# Patient Record
Sex: FEMALE | Race: WHITE | Employment: OTHER | URBAN - METROPOLITAN AREA
[De-identification: names, ages, dates, MRNs, and addresses within clinical notes are randomized per-mention and may not be internally consistent; named-entity substitution may affect disease eponyms.]

---

## 2018-12-13 ENCOUNTER — HOSPITAL ENCOUNTER (OUTPATIENT)
Age: 48
Setting detail: OBSERVATION
Discharge: HOME | End: 2018-12-14
Attending: EMERGENCY MEDICINE | Admitting: SURGERY
Payer: COMMERCIAL

## 2018-12-13 ENCOUNTER — APPOINTMENT (OUTPATIENT)
Dept: ULTRASOUND IMAGING | Age: 48
End: 2018-12-13
Attending: EMERGENCY MEDICINE
Payer: COMMERCIAL

## 2018-12-13 ENCOUNTER — ANESTHESIA (OUTPATIENT)
Dept: SURGERY | Age: 48
End: 2018-12-13
Payer: COMMERCIAL

## 2018-12-13 ENCOUNTER — APPOINTMENT (OUTPATIENT)
Dept: CT IMAGING | Age: 48
End: 2018-12-13
Attending: EMERGENCY MEDICINE
Payer: COMMERCIAL

## 2018-12-13 ENCOUNTER — ANESTHESIA EVENT (OUTPATIENT)
Dept: SURGERY | Age: 48
End: 2018-12-13
Payer: COMMERCIAL

## 2018-12-13 DIAGNOSIS — K81.9 CHOLECYSTITIS: Primary | ICD-10-CM

## 2018-12-13 LAB
ALBUMIN SERPL-MCNC: 3.8 G/DL (ref 3.5–5)
ALBUMIN/GLOB SERPL: 1 {RATIO} (ref 1.1–2.2)
ALP SERPL-CCNC: 101 U/L (ref 45–117)
ALT SERPL-CCNC: 30 U/L (ref 12–78)
ANION GAP SERPL CALC-SCNC: 10 MMOL/L (ref 5–15)
APPEARANCE UR: CLEAR
AST SERPL-CCNC: 20 U/L (ref 15–37)
ATRIAL RATE: 73 BPM
BACTERIA URNS QL MICRO: NEGATIVE /HPF
BASOPHILS # BLD: 0 K/UL (ref 0–0.1)
BASOPHILS NFR BLD: 1 % (ref 0–1)
BILIRUB SERPL-MCNC: 0.4 MG/DL (ref 0.2–1)
BILIRUB UR QL: NEGATIVE
BUN SERPL-MCNC: 17 MG/DL (ref 6–20)
BUN/CREAT SERPL: 24 (ref 12–20)
CALCIUM SERPL-MCNC: 9.3 MG/DL (ref 8.5–10.1)
CALCULATED P AXIS, ECG09: 47 DEGREES
CALCULATED R AXIS, ECG10: 101 DEGREES
CALCULATED T AXIS, ECG11: 37 DEGREES
CHLORIDE SERPL-SCNC: 101 MMOL/L (ref 97–108)
CO2 SERPL-SCNC: 29 MMOL/L (ref 21–32)
COLOR UR: NORMAL
COMMENT, HOLDF: NORMAL
CREAT SERPL-MCNC: 0.7 MG/DL (ref 0.55–1.02)
D DIMER PPP FEU-MCNC: 0.69 MG/L FEU (ref 0–0.65)
DIAGNOSIS, 93000: NORMAL
DIFFERENTIAL METHOD BLD: NORMAL
EOSINOPHIL # BLD: 0.2 K/UL (ref 0–0.4)
EOSINOPHIL NFR BLD: 3 % (ref 0–7)
EPITH CASTS URNS QL MICRO: NORMAL /LPF
ERYTHROCYTE [DISTWIDTH] IN BLOOD BY AUTOMATED COUNT: 11.5 % (ref 11.5–14.5)
GLOBULIN SER CALC-MCNC: 3.8 G/DL (ref 2–4)
GLUCOSE SERPL-MCNC: 119 MG/DL (ref 65–100)
GLUCOSE UR STRIP.AUTO-MCNC: NEGATIVE MG/DL
HCG UR QL: NEGATIVE
HCT VFR BLD AUTO: 43.9 % (ref 35–47)
HGB BLD-MCNC: 14.4 G/DL (ref 11.5–16)
HGB UR QL STRIP: NEGATIVE
IMM GRANULOCYTES # BLD: 0 K/UL (ref 0–0.04)
IMM GRANULOCYTES NFR BLD AUTO: 0 % (ref 0–0.5)
INR PPP: 0.9 (ref 0.9–1.1)
KETONES UR QL STRIP.AUTO: NEGATIVE MG/DL
LEUKOCYTE ESTERASE UR QL STRIP.AUTO: NEGATIVE
LIPASE SERPL-CCNC: 149 U/L (ref 73–393)
LYMPHOCYTES # BLD: 2.8 K/UL (ref 0.8–3.5)
LYMPHOCYTES NFR BLD: 33 % (ref 12–49)
MCH RBC QN AUTO: 30.1 PG (ref 26–34)
MCHC RBC AUTO-ENTMCNC: 32.8 G/DL (ref 30–36.5)
MCV RBC AUTO: 91.8 FL (ref 80–99)
MONOCYTES # BLD: 0.5 K/UL (ref 0–1)
MONOCYTES NFR BLD: 6 % (ref 5–13)
NEUTS SEG # BLD: 5 K/UL (ref 1.8–8)
NEUTS SEG NFR BLD: 58 % (ref 32–75)
NITRITE UR QL STRIP.AUTO: NEGATIVE
NRBC # BLD: 0 K/UL (ref 0–0.01)
NRBC BLD-RTO: 0 PER 100 WBC
P-R INTERVAL, ECG05: 188 MS
PH UR STRIP: 7.5 [PH] (ref 5–8)
PLATELET # BLD AUTO: 258 K/UL (ref 150–400)
PMV BLD AUTO: 10.4 FL (ref 8.9–12.9)
POTASSIUM SERPL-SCNC: 3.4 MMOL/L (ref 3.5–5.1)
PROT SERPL-MCNC: 7.6 G/DL (ref 6.4–8.2)
PROT UR STRIP-MCNC: NEGATIVE MG/DL
PROTHROMBIN TIME: 9.2 SEC (ref 9–11.1)
Q-T INTERVAL, ECG07: 386 MS
QRS DURATION, ECG06: 100 MS
QTC CALCULATION (BEZET), ECG08: 425 MS
RBC # BLD AUTO: 4.78 M/UL (ref 3.8–5.2)
RBC #/AREA URNS HPF: NORMAL /HPF (ref 0–5)
SAMPLES BEING HELD,HOLD: NORMAL
SODIUM SERPL-SCNC: 140 MMOL/L (ref 136–145)
SP GR UR REFRACTOMETRY: 1.01 (ref 1–1.03)
TROPONIN I SERPL-MCNC: <0.05 NG/ML
UR CULT HOLD, URHOLD: NORMAL
UROBILINOGEN UR QL STRIP.AUTO: 0.2 EU/DL (ref 0.2–1)
VENTRICULAR RATE, ECG03: 73 BPM
WBC # BLD AUTO: 8.5 K/UL (ref 3.6–11)
WBC URNS QL MICRO: NORMAL /HPF (ref 0–4)

## 2018-12-13 PROCEDURE — 77030008756 HC TU IRR SUC STRY -B: Performed by: SURGERY

## 2018-12-13 PROCEDURE — 77030020747 HC TU INSUF ENDOSC TELE -A: Performed by: SURGERY

## 2018-12-13 PROCEDURE — 74011000258 HC RX REV CODE- 258: Performed by: EMERGENCY MEDICINE

## 2018-12-13 PROCEDURE — 36415 COLL VENOUS BLD VENIPUNCTURE: CPT

## 2018-12-13 PROCEDURE — 77030035048 HC TRCR ENDOSC OPTCL COVD -B: Performed by: SURGERY

## 2018-12-13 PROCEDURE — 77030032490 HC SLV COMPR SCD KNE COVD -B: Performed by: SURGERY

## 2018-12-13 PROCEDURE — 74011636320 HC RX REV CODE- 636/320: Performed by: EMERGENCY MEDICINE

## 2018-12-13 PROCEDURE — 84484 ASSAY OF TROPONIN QUANT: CPT

## 2018-12-13 PROCEDURE — 74011250636 HC RX REV CODE- 250/636: Performed by: SURGERY

## 2018-12-13 PROCEDURE — 96361 HYDRATE IV INFUSION ADD-ON: CPT

## 2018-12-13 PROCEDURE — 74011250636 HC RX REV CODE- 250/636: Performed by: NURSE PRACTITIONER

## 2018-12-13 PROCEDURE — 77030009403 HC ELECTRD ENDO MEGA -B: Performed by: SURGERY

## 2018-12-13 PROCEDURE — 74174 CTA ABD&PLVS W/CONTRAST: CPT

## 2018-12-13 PROCEDURE — 77030020263 HC SOL INJ SOD CL0.9% LFCR 1000ML: Performed by: SURGERY

## 2018-12-13 PROCEDURE — 77030035045 HC TRCR ENDOSC VRSPRT BLDLSS COVD -B: Performed by: SURGERY

## 2018-12-13 PROCEDURE — 76705 ECHO EXAM OF ABDOMEN: CPT

## 2018-12-13 PROCEDURE — 74011250636 HC RX REV CODE- 250/636

## 2018-12-13 PROCEDURE — 99218 HC RM OBSERVATION: CPT

## 2018-12-13 PROCEDURE — 77030018836 HC SOL IRR NACL ICUM -A: Performed by: SURGERY

## 2018-12-13 PROCEDURE — 96376 TX/PRO/DX INJ SAME DRUG ADON: CPT

## 2018-12-13 PROCEDURE — 74011250637 HC RX REV CODE- 250/637: Performed by: EMERGENCY MEDICINE

## 2018-12-13 PROCEDURE — 81025 URINE PREGNANCY TEST: CPT

## 2018-12-13 PROCEDURE — 74011000250 HC RX REV CODE- 250

## 2018-12-13 PROCEDURE — 96375 TX/PRO/DX INJ NEW DRUG ADDON: CPT

## 2018-12-13 PROCEDURE — 93005 ELECTROCARDIOGRAM TRACING: CPT

## 2018-12-13 PROCEDURE — 76210000006 HC OR PH I REC 0.5 TO 1 HR: Performed by: SURGERY

## 2018-12-13 PROCEDURE — 96365 THER/PROPH/DIAG IV INF INIT: CPT

## 2018-12-13 PROCEDURE — 74011250636 HC RX REV CODE- 250/636: Performed by: ANESTHESIOLOGY

## 2018-12-13 PROCEDURE — 85379 FIBRIN DEGRADATION QUANT: CPT

## 2018-12-13 PROCEDURE — 74011250636 HC RX REV CODE- 250/636: Performed by: EMERGENCY MEDICINE

## 2018-12-13 PROCEDURE — 77030031139 HC SUT VCRL2 J&J -A: Performed by: SURGERY

## 2018-12-13 PROCEDURE — 77030020782 HC GWN BAIR PAWS FLX 3M -B

## 2018-12-13 PROCEDURE — 77030011640 HC PAD GRND REM COVD -A: Performed by: SURGERY

## 2018-12-13 PROCEDURE — 85610 PROTHROMBIN TIME: CPT

## 2018-12-13 PROCEDURE — 74011000250 HC RX REV CODE- 250: Performed by: SURGERY

## 2018-12-13 PROCEDURE — 76010000149 HC OR TIME 1 TO 1.5 HR: Performed by: SURGERY

## 2018-12-13 PROCEDURE — 77030008684 HC TU ET CUF COVD -B: Performed by: NURSE ANESTHETIST, CERTIFIED REGISTERED

## 2018-12-13 PROCEDURE — 76060000033 HC ANESTHESIA 1 TO 1.5 HR: Performed by: SURGERY

## 2018-12-13 PROCEDURE — 74011250637 HC RX REV CODE- 250/637: Performed by: SURGERY

## 2018-12-13 PROCEDURE — 99284 EMERGENCY DEPT VISIT MOD MDM: CPT

## 2018-12-13 PROCEDURE — 80053 COMPREHEN METABOLIC PANEL: CPT

## 2018-12-13 PROCEDURE — 81001 URINALYSIS AUTO W/SCOPE: CPT

## 2018-12-13 PROCEDURE — 77030002933 HC SUT MCRYL J&J -A: Performed by: SURGERY

## 2018-12-13 PROCEDURE — 77030026438 HC STYL ET INTUB CARD -A: Performed by: NURSE ANESTHETIST, CERTIFIED REGISTERED

## 2018-12-13 PROCEDURE — 77030010515 HC APPL ENDOCLP LIG J&J -B: Performed by: SURGERY

## 2018-12-13 PROCEDURE — 88304 TISSUE EXAM BY PATHOLOGIST: CPT

## 2018-12-13 PROCEDURE — 71275 CT ANGIOGRAPHY CHEST: CPT

## 2018-12-13 PROCEDURE — 77030018875 HC APPL CLP LIG4 J&J -B: Performed by: SURGERY

## 2018-12-13 PROCEDURE — 77030035029 HC NDL INSUF VERES DISP COVD -B: Performed by: SURGERY

## 2018-12-13 PROCEDURE — 85025 COMPLETE CBC W/AUTO DIFF WBC: CPT

## 2018-12-13 PROCEDURE — 77030009852 HC PCH RTVR ENDOSC COVD -B: Performed by: SURGERY

## 2018-12-13 PROCEDURE — 74011000258 HC RX REV CODE- 258: Performed by: NURSE PRACTITIONER

## 2018-12-13 PROCEDURE — 77030035051: Performed by: SURGERY

## 2018-12-13 PROCEDURE — 77030013079 HC BLNKT BAIR HGGR 3M -A: Performed by: NURSE ANESTHETIST, CERTIFIED REGISTERED

## 2018-12-13 PROCEDURE — 83690 ASSAY OF LIPASE: CPT

## 2018-12-13 RX ORDER — HYDROCODONE BITARTRATE AND ACETAMINOPHEN 5; 325 MG/1; MG/1
1 TABLET ORAL AS NEEDED
Status: DISCONTINUED | OUTPATIENT
Start: 2018-12-13 | End: 2018-12-14 | Stop reason: HOSPADM

## 2018-12-13 RX ORDER — ONDANSETRON 2 MG/ML
4 INJECTION INTRAMUSCULAR; INTRAVENOUS
Status: DISCONTINUED | OUTPATIENT
Start: 2018-12-13 | End: 2018-12-14 | Stop reason: HOSPADM

## 2018-12-13 RX ORDER — SODIUM CHLORIDE, SODIUM LACTATE, POTASSIUM CHLORIDE, CALCIUM CHLORIDE 600; 310; 30; 20 MG/100ML; MG/100ML; MG/100ML; MG/100ML
1000 INJECTION, SOLUTION INTRAVENOUS CONTINUOUS
Status: DISCONTINUED | OUTPATIENT
Start: 2018-12-13 | End: 2018-12-13 | Stop reason: HOSPADM

## 2018-12-13 RX ORDER — DEXAMETHASONE SODIUM PHOSPHATE 4 MG/ML
INJECTION, SOLUTION INTRA-ARTICULAR; INTRALESIONAL; INTRAMUSCULAR; INTRAVENOUS; SOFT TISSUE AS NEEDED
Status: DISCONTINUED | OUTPATIENT
Start: 2018-12-13 | End: 2018-12-13 | Stop reason: HOSPADM

## 2018-12-13 RX ORDER — BUPIVACAINE HYDROCHLORIDE AND EPINEPHRINE 5; 5 MG/ML; UG/ML
30 INJECTION, SOLUTION EPIDURAL; INTRACAUDAL; PERINEURAL ONCE
Status: COMPLETED | OUTPATIENT
Start: 2018-12-13 | End: 2018-12-13

## 2018-12-13 RX ORDER — OXYCODONE AND ACETAMINOPHEN 5; 325 MG/1; MG/1
1-2 TABLET ORAL
Qty: 20 TAB | Refills: 0 | Status: SHIPPED | OUTPATIENT
Start: 2018-12-13 | End: 2019-10-18

## 2018-12-13 RX ORDER — EPHEDRINE SULFATE 50 MG/ML
5 INJECTION, SOLUTION INTRAVENOUS AS NEEDED
Status: DISCONTINUED | OUTPATIENT
Start: 2018-12-13 | End: 2018-12-14 | Stop reason: HOSPADM

## 2018-12-13 RX ORDER — LEVETIRACETAM 500 MG/1
1000 TABLET ORAL 2 TIMES DAILY
Status: DISCONTINUED | OUTPATIENT
Start: 2018-12-13 | End: 2018-12-14 | Stop reason: HOSPADM

## 2018-12-13 RX ORDER — MORPHINE SULFATE 10 MG/ML
2 INJECTION, SOLUTION INTRAMUSCULAR; INTRAVENOUS
Status: DISCONTINUED | OUTPATIENT
Start: 2018-12-13 | End: 2018-12-14 | Stop reason: HOSPADM

## 2018-12-13 RX ORDER — FENTANYL CITRATE 50 UG/ML
50 INJECTION, SOLUTION INTRAMUSCULAR; INTRAVENOUS AS NEEDED
Status: DISCONTINUED | OUTPATIENT
Start: 2018-12-13 | End: 2018-12-13 | Stop reason: HOSPADM

## 2018-12-13 RX ORDER — LIDOCAINE HYDROCHLORIDE 10 MG/ML
0.1 INJECTION, SOLUTION EPIDURAL; INFILTRATION; INTRACAUDAL; PERINEURAL AS NEEDED
Status: DISCONTINUED | OUTPATIENT
Start: 2018-12-13 | End: 2018-12-13 | Stop reason: HOSPADM

## 2018-12-13 RX ORDER — SODIUM CHLORIDE 9 MG/ML
25 INJECTION, SOLUTION INTRAVENOUS CONTINUOUS
Status: DISCONTINUED | OUTPATIENT
Start: 2018-12-13 | End: 2018-12-13 | Stop reason: HOSPADM

## 2018-12-13 RX ORDER — GLYCOPYRROLATE 0.2 MG/ML
0.2 INJECTION INTRAMUSCULAR; INTRAVENOUS
Status: DISCONTINUED | OUTPATIENT
Start: 2018-12-13 | End: 2018-12-13 | Stop reason: HOSPADM

## 2018-12-13 RX ORDER — SODIUM CHLORIDE 9 MG/ML
25 INJECTION, SOLUTION INTRAVENOUS CONTINUOUS
Status: DISCONTINUED | OUTPATIENT
Start: 2018-12-14 | End: 2018-12-14 | Stop reason: HOSPADM

## 2018-12-13 RX ORDER — ONDANSETRON 2 MG/ML
INJECTION INTRAMUSCULAR; INTRAVENOUS AS NEEDED
Status: DISCONTINUED | OUTPATIENT
Start: 2018-12-13 | End: 2018-12-13 | Stop reason: HOSPADM

## 2018-12-13 RX ORDER — AMLODIPINE BESYLATE 5 MG/1
2.5 TABLET ORAL
Status: COMPLETED | OUTPATIENT
Start: 2018-12-13 | End: 2018-12-13

## 2018-12-13 RX ORDER — ROPIVACAINE HYDROCHLORIDE 5 MG/ML
30 INJECTION, SOLUTION EPIDURAL; INFILTRATION; PERINEURAL AS NEEDED
Status: DISCONTINUED | OUTPATIENT
Start: 2018-12-13 | End: 2018-12-13 | Stop reason: HOSPADM

## 2018-12-13 RX ORDER — DIPHENHYDRAMINE HCL 25 MG
25 CAPSULE ORAL
Status: DISCONTINUED | OUTPATIENT
Start: 2018-12-13 | End: 2018-12-14 | Stop reason: HOSPADM

## 2018-12-13 RX ORDER — MORPHINE SULFATE 10 MG/ML
4 INJECTION, SOLUTION INTRAMUSCULAR; INTRAVENOUS ONCE
Status: COMPLETED | OUTPATIENT
Start: 2018-12-13 | End: 2018-12-13

## 2018-12-13 RX ORDER — MIDAZOLAM HYDROCHLORIDE 1 MG/ML
INJECTION, SOLUTION INTRAMUSCULAR; INTRAVENOUS AS NEEDED
Status: DISCONTINUED | OUTPATIENT
Start: 2018-12-13 | End: 2018-12-13 | Stop reason: HOSPADM

## 2018-12-13 RX ORDER — HYDROMORPHONE HYDROCHLORIDE 2 MG/ML
.5-1 INJECTION, SOLUTION INTRAMUSCULAR; INTRAVENOUS; SUBCUTANEOUS
Status: DISCONTINUED | OUTPATIENT
Start: 2018-12-13 | End: 2018-12-14 | Stop reason: HOSPADM

## 2018-12-13 RX ORDER — ACETAMINOPHEN 325 MG/1
650 TABLET ORAL
Status: DISCONTINUED | OUTPATIENT
Start: 2018-12-13 | End: 2018-12-14 | Stop reason: HOSPADM

## 2018-12-13 RX ORDER — MORPHINE SULFATE 10 MG/ML
6 INJECTION, SOLUTION INTRAMUSCULAR; INTRAVENOUS ONCE
Status: COMPLETED | OUTPATIENT
Start: 2018-12-13 | End: 2018-12-13

## 2018-12-13 RX ORDER — LACOSAMIDE 50 MG/1
200 TABLET ORAL
Status: DISCONTINUED | OUTPATIENT
Start: 2018-12-13 | End: 2018-12-14 | Stop reason: HOSPADM

## 2018-12-13 RX ORDER — CEFAZOLIN SODIUM 1 G/3ML
INJECTION, POWDER, FOR SOLUTION INTRAMUSCULAR; INTRAVENOUS AS NEEDED
Status: DISCONTINUED | OUTPATIENT
Start: 2018-12-13 | End: 2018-12-13 | Stop reason: HOSPADM

## 2018-12-13 RX ORDER — SODIUM CHLORIDE, SODIUM LACTATE, POTASSIUM CHLORIDE, CALCIUM CHLORIDE 600; 310; 30; 20 MG/100ML; MG/100ML; MG/100ML; MG/100ML
100 INJECTION, SOLUTION INTRAVENOUS CONTINUOUS
Status: DISCONTINUED | OUTPATIENT
Start: 2018-12-13 | End: 2018-12-14 | Stop reason: HOSPADM

## 2018-12-13 RX ORDER — GUAIFENESIN 100 MG/5ML
162 LIQUID (ML) ORAL
Status: COMPLETED | OUTPATIENT
Start: 2018-12-13 | End: 2018-12-13

## 2018-12-13 RX ORDER — MIDAZOLAM HYDROCHLORIDE 1 MG/ML
0.5 INJECTION, SOLUTION INTRAMUSCULAR; INTRAVENOUS
Status: DISCONTINUED | OUTPATIENT
Start: 2018-12-13 | End: 2018-12-14 | Stop reason: HOSPADM

## 2018-12-13 RX ORDER — PROPOFOL 10 MG/ML
INJECTION, EMULSION INTRAVENOUS AS NEEDED
Status: DISCONTINUED | OUTPATIENT
Start: 2018-12-13 | End: 2018-12-13 | Stop reason: HOSPADM

## 2018-12-13 RX ORDER — FENTANYL CITRATE 50 UG/ML
INJECTION, SOLUTION INTRAMUSCULAR; INTRAVENOUS AS NEEDED
Status: DISCONTINUED | OUTPATIENT
Start: 2018-12-13 | End: 2018-12-13 | Stop reason: HOSPADM

## 2018-12-13 RX ORDER — OXYCODONE AND ACETAMINOPHEN 5; 325 MG/1; MG/1
1-2 TABLET ORAL
Status: DISCONTINUED | OUTPATIENT
Start: 2018-12-13 | End: 2018-12-14 | Stop reason: HOSPADM

## 2018-12-13 RX ORDER — LIDOCAINE HYDROCHLORIDE 20 MG/ML
INJECTION, SOLUTION EPIDURAL; INFILTRATION; INTRACAUDAL; PERINEURAL AS NEEDED
Status: DISCONTINUED | OUTPATIENT
Start: 2018-12-13 | End: 2018-12-13 | Stop reason: HOSPADM

## 2018-12-13 RX ORDER — ROCURONIUM BROMIDE 10 MG/ML
INJECTION, SOLUTION INTRAVENOUS AS NEEDED
Status: DISCONTINUED | OUTPATIENT
Start: 2018-12-13 | End: 2018-12-13 | Stop reason: HOSPADM

## 2018-12-13 RX ORDER — LACOSAMIDE 50 MG/1
150 TABLET ORAL DAILY
Status: DISCONTINUED | OUTPATIENT
Start: 2018-12-13 | End: 2018-12-14 | Stop reason: HOSPADM

## 2018-12-13 RX ORDER — NEOSTIGMINE METHYLSULFATE 1 MG/ML
INJECTION INTRAVENOUS AS NEEDED
Status: DISCONTINUED | OUTPATIENT
Start: 2018-12-13 | End: 2018-12-13 | Stop reason: HOSPADM

## 2018-12-13 RX ORDER — LEVETIRACETAM 500 MG/1
500 TABLET ORAL 2 TIMES DAILY
Status: DISCONTINUED | OUTPATIENT
Start: 2018-12-13 | End: 2018-12-13

## 2018-12-13 RX ORDER — GLYCOPYRROLATE 0.2 MG/ML
INJECTION INTRAMUSCULAR; INTRAVENOUS AS NEEDED
Status: DISCONTINUED | OUTPATIENT
Start: 2018-12-13 | End: 2018-12-13 | Stop reason: HOSPADM

## 2018-12-13 RX ORDER — MIDAZOLAM HYDROCHLORIDE 1 MG/ML
1 INJECTION, SOLUTION INTRAMUSCULAR; INTRAVENOUS AS NEEDED
Status: DISCONTINUED | OUTPATIENT
Start: 2018-12-13 | End: 2018-12-13 | Stop reason: HOSPADM

## 2018-12-13 RX ORDER — LEVETIRACETAM 500 MG/1
500 TABLET ORAL ONCE
Status: COMPLETED | OUTPATIENT
Start: 2018-12-13 | End: 2018-12-13

## 2018-12-13 RX ORDER — SUCCINYLCHOLINE CHLORIDE 20 MG/ML
INJECTION INTRAMUSCULAR; INTRAVENOUS AS NEEDED
Status: DISCONTINUED | OUTPATIENT
Start: 2018-12-13 | End: 2018-12-13 | Stop reason: HOSPADM

## 2018-12-13 RX ORDER — SODIUM CHLORIDE, SODIUM LACTATE, POTASSIUM CHLORIDE, CALCIUM CHLORIDE 600; 310; 30; 20 MG/100ML; MG/100ML; MG/100ML; MG/100ML
1000 INJECTION, SOLUTION INTRAVENOUS CONTINUOUS
Status: DISCONTINUED | OUTPATIENT
Start: 2018-12-14 | End: 2018-12-14 | Stop reason: HOSPADM

## 2018-12-13 RX ORDER — ONDANSETRON 2 MG/ML
4 INJECTION INTRAMUSCULAR; INTRAVENOUS AS NEEDED
Status: DISCONTINUED | OUTPATIENT
Start: 2018-12-13 | End: 2018-12-14 | Stop reason: HOSPADM

## 2018-12-13 RX ORDER — DEXMEDETOMIDINE HYDROCHLORIDE 4 UG/ML
INJECTION, SOLUTION INTRAVENOUS AS NEEDED
Status: DISCONTINUED | OUTPATIENT
Start: 2018-12-13 | End: 2018-12-13 | Stop reason: HOSPADM

## 2018-12-13 RX ORDER — FENTANYL CITRATE 50 UG/ML
25 INJECTION, SOLUTION INTRAMUSCULAR; INTRAVENOUS
Status: DISCONTINUED | OUTPATIENT
Start: 2018-12-13 | End: 2018-12-14 | Stop reason: HOSPADM

## 2018-12-13 RX ORDER — KETOROLAC TROMETHAMINE 30 MG/ML
INJECTION, SOLUTION INTRAMUSCULAR; INTRAVENOUS AS NEEDED
Status: DISCONTINUED | OUTPATIENT
Start: 2018-12-13 | End: 2018-12-13 | Stop reason: HOSPADM

## 2018-12-13 RX ORDER — ALBUTEROL SULFATE 0.83 MG/ML
2.5 SOLUTION RESPIRATORY (INHALATION) AS NEEDED
Status: DISCONTINUED | OUTPATIENT
Start: 2018-12-13 | End: 2018-12-14 | Stop reason: HOSPADM

## 2018-12-13 RX ORDER — KETOROLAC TROMETHAMINE 30 MG/ML
15 INJECTION, SOLUTION INTRAMUSCULAR; INTRAVENOUS EVERY 6 HOURS
Status: DISCONTINUED | OUTPATIENT
Start: 2018-12-14 | End: 2018-12-14 | Stop reason: HOSPADM

## 2018-12-13 RX ORDER — ONDANSETRON 2 MG/ML
4 INJECTION INTRAMUSCULAR; INTRAVENOUS
Status: COMPLETED | OUTPATIENT
Start: 2018-12-13 | End: 2018-12-13

## 2018-12-13 RX ADMIN — SODIUM CHLORIDE, SODIUM LACTATE, POTASSIUM CHLORIDE, AND CALCIUM CHLORIDE 100 ML/HR: 600; 310; 30; 20 INJECTION, SOLUTION INTRAVENOUS at 07:11

## 2018-12-13 RX ADMIN — ONDANSETRON 4 MG: 2 INJECTION INTRAMUSCULAR; INTRAVENOUS at 21:46

## 2018-12-13 RX ADMIN — SODIUM CHLORIDE 500 ML: 900 INJECTION, SOLUTION INTRAVENOUS at 03:27

## 2018-12-13 RX ADMIN — FENTANYL CITRATE 50 MCG: 50 INJECTION, SOLUTION INTRAMUSCULAR; INTRAVENOUS at 21:41

## 2018-12-13 RX ADMIN — DEXMEDETOMIDINE HYDROCHLORIDE 8 MCG: 4 INJECTION, SOLUTION INTRAVENOUS at 22:12

## 2018-12-13 RX ADMIN — NEOSTIGMINE METHYLSULFATE 3 MG: 1 INJECTION INTRAVENOUS at 22:36

## 2018-12-13 RX ADMIN — LEVETIRACETAM 500 MG: 500 TABLET ORAL at 09:54

## 2018-12-13 RX ADMIN — LEVETIRACETAM 1000 MG: 500 TABLET ORAL at 20:27

## 2018-12-13 RX ADMIN — DEXMEDETOMIDINE HYDROCHLORIDE 8 MCG: 4 INJECTION, SOLUTION INTRAVENOUS at 22:13

## 2018-12-13 RX ADMIN — ASPIRIN 81 MG CHEWABLE TABLET 162 MG: 81 TABLET CHEWABLE at 03:15

## 2018-12-13 RX ADMIN — ACETAMINOPHEN 650 MG: 325 TABLET ORAL at 19:08

## 2018-12-13 RX ADMIN — DEXAMETHASONE SODIUM PHOSPHATE 4 MG: 4 INJECTION, SOLUTION INTRA-ARTICULAR; INTRALESIONAL; INTRAMUSCULAR; INTRAVENOUS; SOFT TISSUE at 21:45

## 2018-12-13 RX ADMIN — ONDANSETRON 4 MG: 2 INJECTION INTRAMUSCULAR; INTRAVENOUS at 03:15

## 2018-12-13 RX ADMIN — GLYCOPYRROLATE 0.4 MG: 0.2 INJECTION INTRAMUSCULAR; INTRAVENOUS at 22:36

## 2018-12-13 RX ADMIN — FENTANYL CITRATE 25 MCG: 50 INJECTION INTRAMUSCULAR; INTRAVENOUS at 23:44

## 2018-12-13 RX ADMIN — KETOROLAC TROMETHAMINE 30 MG: 30 INJECTION, SOLUTION INTRAMUSCULAR; INTRAVENOUS at 22:49

## 2018-12-13 RX ADMIN — IOPAMIDOL 98 ML: 755 INJECTION, SOLUTION INTRAVENOUS at 04:52

## 2018-12-13 RX ADMIN — FENTANYL CITRATE 50 MCG: 50 INJECTION, SOLUTION INTRAMUSCULAR; INTRAVENOUS at 21:57

## 2018-12-13 RX ADMIN — SUCCINYLCHOLINE CHLORIDE 140 MG: 20 INJECTION INTRAMUSCULAR; INTRAVENOUS at 21:42

## 2018-12-13 RX ADMIN — AMLODIPINE BESYLATE 2.5 MG: 5 TABLET ORAL at 06:47

## 2018-12-13 RX ADMIN — LACOSAMIDE 150 MG: 50 TABLET, FILM COATED ORAL at 10:54

## 2018-12-13 RX ADMIN — ROCURONIUM BROMIDE 10 MG: 10 INJECTION, SOLUTION INTRAVENOUS at 21:41

## 2018-12-13 RX ADMIN — PIPERACILLIN SODIUM, TAZOBACTAM SODIUM 3.38 G: 3; .375 INJECTION, POWDER, LYOPHILIZED, FOR SOLUTION INTRAVENOUS at 19:08

## 2018-12-13 RX ADMIN — LIDOCAINE HYDROCHLORIDE 100 MG: 20 INJECTION, SOLUTION EPIDURAL; INFILTRATION; INTRACAUDAL; PERINEURAL at 21:41

## 2018-12-13 RX ADMIN — SODIUM CHLORIDE, SODIUM LACTATE, POTASSIUM CHLORIDE, AND CALCIUM CHLORIDE 100 ML/HR: 600; 310; 30; 20 INJECTION, SOLUTION INTRAVENOUS at 23:19

## 2018-12-13 RX ADMIN — MORPHINE SULFATE 6 MG: 10 INJECTION INTRAVENOUS at 03:15

## 2018-12-13 RX ADMIN — MORPHINE SULFATE 4 MG: 10 INJECTION INTRAVENOUS at 04:02

## 2018-12-13 RX ADMIN — PROPOFOL 150 MG: 10 INJECTION, EMULSION INTRAVENOUS at 21:41

## 2018-12-13 RX ADMIN — DEXMEDETOMIDINE HYDROCHLORIDE 8 MCG: 4 INJECTION, SOLUTION INTRAVENOUS at 22:07

## 2018-12-13 RX ADMIN — LEVETIRACETAM 500 MG: 500 TABLET, FILM COATED ORAL at 10:00

## 2018-12-13 RX ADMIN — ACETAMINOPHEN 650 MG: 325 TABLET ORAL at 12:56

## 2018-12-13 RX ADMIN — FENTANYL CITRATE 25 MCG: 50 INJECTION INTRAMUSCULAR; INTRAVENOUS at 23:49

## 2018-12-13 RX ADMIN — CEFAZOLIN SODIUM 2 G: 1 INJECTION, POWDER, FOR SOLUTION INTRAMUSCULAR; INTRAVENOUS at 21:50

## 2018-12-13 RX ADMIN — LACOSAMIDE 200 MG: 50 TABLET, FILM COATED ORAL at 20:26

## 2018-12-13 RX ADMIN — MIDAZOLAM HYDROCHLORIDE 2 MG: 1 INJECTION, SOLUTION INTRAMUSCULAR; INTRAVENOUS at 21:38

## 2018-12-13 RX ADMIN — ROCURONIUM BROMIDE 20 MG: 10 INJECTION, SOLUTION INTRAVENOUS at 21:50

## 2018-12-13 RX ADMIN — DEXMEDETOMIDINE HYDROCHLORIDE 8 MCG: 4 INJECTION, SOLUTION INTRAVENOUS at 22:06

## 2018-12-13 RX ADMIN — SODIUM CHLORIDE, SODIUM LACTATE, POTASSIUM CHLORIDE, AND CALCIUM CHLORIDE: 600; 310; 30; 20 INJECTION, SOLUTION INTRAVENOUS at 22:41

## 2018-12-13 RX ADMIN — PIPERACILLIN SODIUM,TAZOBACTAM SODIUM 3.38 G: 3; .375 INJECTION, POWDER, FOR SOLUTION INTRAVENOUS at 06:37

## 2018-12-13 NOTE — ED NOTES
Pt ambulated to restroom at this time with one assist. Pt provided with urine cup and verbalized understanding.

## 2018-12-13 NOTE — ED TRIAGE NOTES
Pt arrives to ed ambulatory with complaints of back pain that radiates to her abdomen. Pt states pain started approximately 40 mins ago and woke her up from sleep. Pt also states nausea and her stomach is distended.

## 2018-12-13 NOTE — PROGRESS NOTES
Day #1 of Zosyn  Indication:  Intra abd infection  Current regimen:  2.25 gm IV q8h    Recent Labs     18  0313   WBC 8.5   CREA 0.70   BUN 17     Est CrCl: 90-95 ml/min  Temp (24hrs), Av.6 °F (36.4 °C), Min:97.1 °F (36.2 °C), Max:97.9 °F (36.6 °C)    Adjusted to 3.375 gm IV q8h over 4 hrs for crcl > 20 per extended interval protocol.

## 2018-12-13 NOTE — CONSULTS
[facility] at [facility]  H&P    Admit Date: 2018  Reason for Consultation: gallbladder     CC:  Abdominal pain    HPI:  Henry Ramon is a 46 y.o. female who presents for evaluation of abdominal pain. She began having pain across her upper abdomen and right upper back around 2 am this morning, intensity rated at 8/10. She felt nauseated, but no vomiting. No fever or chills. Last PO intake was yesterday. She acknowledges she had a similar episode about two years and was seen at [facility], but work-up was ultimately negative. She has been having issues with intermittent bloating and abdominal pain 1-2 episodes per month since that time, but usually self treats with bowel rest/tums and it goes away. She has her first colonoscopy scheduled for January. She's had 10 lbs of intentional weight loss over the last few months. History significant for stroke at age 30 from left internal carotid dissection. She has residual right sided weakness and takes multiple medications for seizure disorder. She is not on any anticoagulation. Also has diagnosis of Gevena Petties and HTN. Surgical history significant for appendectomy, abdominoplasty, , and ex laparotomy with lysis of adhesions for pelvic pain. CTA abd/pelv/chest 18 IMPRESSION:   1. No aortic dissection or aneurysm. No acute pulmonary embolism. 2. No acute abnormality in the chest.  3. Mild gallbladder wall thickening is nonspecific. There is no other acute  abnormality in the abdomen or pelvis. US 18 IMPRESSION:   1. Cholelithiasis and gallbladder wall thickening. There is a positive  sonographic Espinoza sign suggesting acute cholecystitis. There is no biliary duct  dilatation. 2. Hepatomegaly and hepatic steatosis.      Patient Active Problem List    Diagnosis Date Noted    Cholecystitis 2018     Past Medical History:   Diagnosis Date    Arthritis     neck and wrist    Chronic pain DJD Cervical spine    GERD (gastroesophageal reflux disease)     Ill-defined condition     erlos danchava    Psychiatric disorder     Depression, anxiety    Seizures (HCC)     simple partial seizures last one beginning of 2016    Stroke [facility]) 1996    Residual Right Sided Weakness    Thromboembolus [facility])     with stroke      Past Surgical History:   Procedure Laterality Date    ABDOMEN SURGERY PROC UNLISTED      Open Appendectomy    HX ABDOMINOPLASTY  2015    abdominoplasty    HX APPENDECTOMY  1987    HX  SECTION  ,  &     HX GYN  9791'M    Laparoscopic lysis of adhesions    HX GYN  2004    Uterine Ablation    HX ORTHOPAEDIC Left  &     ORIF left fractured wrist, implants removed in     HX ORTHOPAEDIC Right     Fusion-right foot, toes 2-5 due to contractures from residual weakness from stroke    HX TONSILLECTOMY        Social History     Tobacco Use    Smoking status: Never Smoker    Smokeless tobacco: Never Used   Substance Use Topics    Alcohol use: Yes     Alcohol/week: 1.2 oz     Types: 2 Glasses of wine per week     Comment: Rarely with dinner/once a week      History reviewed. No pertinent family history. Prior to Admission medications    Medication Sig Start Date End Date Taking? Authorizing Provider   amlodipine besylate (AMLODIPINE PO) Take  by mouth. Yes Other, MD Mehul   lacosamide (VIMPAT) 100 mg tab tablet Take 100 mg by mouth two (2) times a day. 1.5 tablet by mouth in morning and 2 tablets in afternoon   Yes Provider, Historical   levETIRAcetam (KEPPRA) 500 mg tablet Take 500 mg by mouth two (2) times a day. 1 tablet every morning 2 tablets at bedtime   Yes Provider, Historical   multivitamin (ONE A DAY) tablet Take 1 Tab by mouth daily. Yes Provider, Historical   DULoxetine (CYMBALTA) 60 mg capsule Take 60 mg by mouth daily.    Yes Provider, Historical   cholecalciferol, vitamin D3, (VITAMIN D3) 2,000 unit tab Take 2,000 Int'l Units by mouth two (2) times a day. Yes Provider, Historical   OTHER daily. vitapulse-CoQ10 100mg,N-Acetylcysteine 250mg, Pyrrroloqinoline Quinone 10mg    Provider, Historical   garlic 8,816 mg cap Take 1,000 mg by mouth daily. Provider, Historical     Current Facility-Administered Medications   Medication Dose Route Frequency    lactated Ringers infusion  100 mL/hr IntraVENous CONTINUOUS    levETIRAcetam (KEPPRA) tablet 500 mg  500 mg Oral BID    HYDROmorphone (DILAUDID) injection 0.5-1 mg  0.5-1 mg IntraVENous Q3H PRN    ondansetron (ZOFRAN) injection 4 mg  4 mg IntraVENous Q6H PRN    acetaminophen (TYLENOL) tablet 650 mg  650 mg Oral Q6H PRN    diphenhydrAMINE (BENADRYL) capsule 25 mg  25 mg Oral Q6H PRN     No Known Allergies       Subjective:     Review of Systems:    A comprehensive review of systems was negative except for that written in the History of Present Illness. Objective:     Blood pressure 126/68, pulse 78, temperature 97.8 °F (36.6 °C), resp. rate 16, weight 78.7 kg (173 lb 8 oz), SpO2 97 %. Temp (24hrs), Av.9 °F (36.6 °C), Min:97.8 °F (36.6 °C), Max:97.9 °F (36.6 °C)      Recent Labs     18  0313   WBC 8.5   HGB 14.4   HCT 43.9        Recent Labs     18  0615 18  0313   NA  --  140   K  --  3.4*   CL  --  101   CO2  --  29   GLU  --  119*   BUN  --  17   CREA  --  0.70   CA  --  9.3   ALB  --  3.8   TBILI  --  0.4   SGOT  --  20   ALT  --  30   INR 0.9  --      Recent Labs     18  0313   LPSE 149         Intake/Output Summary (Last 24 hours) at 2018 0916  Last data filed at 2018 0427  Gross per 24 hour   Intake 500 ml   Output    Net 500 ml        _____________________  Physical Exam:     General:  Alert, cooperative, no distress, appears stated age. Eyes:   Sclera clear. Throat: Lips, mucosa, and tongue normal.   Neck: Supple, symmetrical, trachea midline. Lungs:   Clear to auscultation bilaterally. Heart:  Regular rate and rhythm. Abdomen:   Normal BS, soft, tender across upper abdomen. No masses,  No organomegaly. Extremities: Extremities normal, atraumatic, no cyanosis or edema. Skin: Skin color, texture, turgor normal. No rashes or lesions. Assessment:   Active Problems:    Cholecystitis (12/13/2018)            Plan:     She is agreeable to cholecystectomy  Plan for OR today   NPO  IVF  Pain control    Thank you for allowing us to participate in the care of this patient. Total time spent with patient: 28 minutes. Signed By: Ozzy So NP     December 13, 2018      Pt seen and examined  Agree with above. Has had intermittent pain for years but ate pizza last night and the pain got worse. She has a history of abdominoplasty. Gen- Alert in NAD  Lungs-CTA  H-RRR  Abd- S/minimal RUQ tenderness  Us- GB wall thickening   CT- Gb wall thickening   For lap Melly later today.

## 2018-12-13 NOTE — ED PROVIDER NOTES
The patient presents to the ED with chest pain and abdominal pain. Symptoms began 40 min prior to arrival. She woke up in the pain. The pain is in her upper back and upper abdomen. The pain has a stabbing/burning pain in her back. She has a soreness in her upper abdomen. Pain is severe, 7/10. She denies any fever. She has mild shortness of breath. She does have increased pain with deep breath. She has very bad nausea. She denies any vomiting. Last BM was yesterday PM and loose. She denies any hematuria or dysuria. Nothing makes the pain better or worse. No meds have been taken for pain. She arrives in the ED with her . Surgical hx: appendectomy. The history is provided by the patient and the spouse. Past Medical History:   Diagnosis Date    Arthritis     neck and wrist    Chronic pain     DJD Cervical spine    GERD (gastroesophageal reflux disease)     Ill-defined condition     jennifer campoverde    Psychiatric disorder     Depression, anxiety    Seizures ([facility] [address]     simple partial seizures last one beginning of June 2016    Stroke [facility]) 1996    Residual Right Sided Weakness    Thromboembolus ([facility] [address]     with stroke       Past Surgical History:   Procedure Laterality Date    ABDOMEN SURGERY PROC UNLISTED  1987    Open Appendectomy    HX ABDOMINOPLASTY  2015    abdominoplasty    HX APPENDECTOMY  1987    HX [address], 1995 & 2003    HX GYN  1990's    Laparoscopic lysis of adhesions    HX GYN  2004    Uterine Ablation    HX ORTHOPAEDIC Left 1993 & 2002    ORIF left fractured wrist, implants removed in 2002    HX ORTHOPAEDIC Right     Fusion-right foot, toes 2-5 due to contractures from residual weakness from stroke    HX TONSILLECTOMY           History reviewed. No pertinent family history.     Social History     Socioeconomic History    Marital status:      Spouse name: Not on file    Number of children: Not on file    Years of education: Not on file    Highest education level: Not on file   Social Needs    Financial resource strain: Not on file    Food insecurity - worry: Not on file    Food insecurity - inability: Not on file    Transportation needs - medical: Not on file   Contour needs - non-medical: Not on file   Occupational History    Not on file   Tobacco Use    Smoking status: Never Smoker    Smokeless tobacco: Never Used   Substance and Sexual Activity    Alcohol use: Yes     Alcohol/week: 1.2 oz     Types: 2 Glasses of wine per week     Comment: Rarely with dinner/once a week    Drug use: No    Sexual activity: Not on file   Other Topics Concern    Not on file   Social History Narrative    Not on file         ALLERGIES: Patient has no known allergies. Review of Systems   Constitutional: Negative for appetite change and fever. HENT: Negative for congestion, nosebleeds and sore throat. Eyes: Negative for discharge. Respiratory: Negative for cough and shortness of breath. Cardiovascular: Positive for chest pain. Gastrointestinal: Positive for abdominal pain and nausea. Negative for diarrhea and vomiting. Genitourinary: Negative for dysuria. Musculoskeletal: Negative. Skin: Negative for rash. Neurological: Negative for weakness and headaches. Hematological: Negative for adenopathy. Psychiatric/Behavioral: Negative. All other systems reviewed and are negative. Vitals:    12/13/18 0327 12/13/18 0328 12/13/18 0330 12/13/18 0400   BP: 135/77  104/77 130/87   Pulse:       Resp:       Temp:       SpO2:  99% 98% 92%   Weight:                Physical Exam   Constitutional: She appears well-developed and well-nourished. She appears distressed. HENT:   Head: Normocephalic and atraumatic. Eyes: Conjunctivae are normal.   Neck: Normal range of motion. Neck supple. Cardiovascular: Normal rate, regular rhythm and normal heart sounds. Pulmonary/Chest: Effort normal and breath sounds normal.   Abdominal: Soft.  Bowel sounds are normal. She exhibits no distension. There is tenderness (upper abdominal TTP RUQ>>LUQ). There is no guarding. Neurological: She is alert. Skin: Skin is warm and dry. Psychiatric: She has a normal mood and affect. Nursing note and vitals reviewed. MDM       Procedures      ED EKG interpretation:  Rhythm: normal sinus rhythm; and regular . Rate (approx.): 73; Axis: right axis deviation; P wave: normal; QRS interval: normal ; ST/T wave: non-specific changes; This EKG was interpreted by Mauricio Ruiz MD,ED Provider. 5 AM  Gallbladder wall thickening on CT scan. Continued RUQ TTP. Discussed with the patient. Will obtain RUQ U/S.    6:20 AM  CONSULT:  Dr. Chiquis Haywood - general surgery - will admit    A/P:  1. Acute cholecystitis - will admit  2. Chest pain - suspect referred from cholecystitis. Pain improved in ED.        7:34 AM  Assumed care of pt from Dr. Gustavo Amezquita. Patient is awaiting transport to [facility] for acute cholecystitis. ALS transport has been arranged and should arrive within the hour. Patient currently reports adequate pain control.    Signed By: Jen Velazquez MD     December 13, 2018

## 2018-12-13 NOTE — ROUTINE PROCESS
TRANSFER - OUT REPORT:    Verbal report given to Terese(name) on Dai Party  being transferred to (unit) for routine progression of care       Report consisted of patients Situation, Background, Assessment and   Recommendations(SBAR). Information from the following report(s) SBAR was reviewed with the receiving nurse. Lines:   Peripheral IV 12/13/18 Right Antecubital (Active)   Site Assessment Clean, dry, & intact 12/13/2018  3:18 AM   Phlebitis Assessment 0 12/13/2018  3:18 AM   Infiltration Assessment 0 12/13/2018  3:18 AM   Dressing Status Clean, dry, & intact 12/13/2018  3:18 AM        Opportunity for questions and clarification was provided.       Patient transported with:

## 2018-12-14 VITALS
BODY MASS INDEX: 31.93 KG/M2 | SYSTOLIC BLOOD PRESSURE: 118 MMHG | TEMPERATURE: 97.6 F | HEART RATE: 65 BPM | RESPIRATION RATE: 16 BRPM | HEIGHT: 62 IN | WEIGHT: 173.5 LBS | DIASTOLIC BLOOD PRESSURE: 86 MMHG | OXYGEN SATURATION: 93 %

## 2018-12-14 PROCEDURE — 74011250636 HC RX REV CODE- 250/636: Performed by: SURGERY

## 2018-12-14 PROCEDURE — 99218 HC RM OBSERVATION: CPT

## 2018-12-14 PROCEDURE — 74011000258 HC RX REV CODE- 258: Performed by: NURSE PRACTITIONER

## 2018-12-14 PROCEDURE — 74011250636 HC RX REV CODE- 250/636: Performed by: NURSE PRACTITIONER

## 2018-12-14 PROCEDURE — 74011250637 HC RX REV CODE- 250/637: Performed by: SURGERY

## 2018-12-14 RX ADMIN — PIPERACILLIN SODIUM, TAZOBACTAM SODIUM 3.38 G: 3; .375 INJECTION, POWDER, LYOPHILIZED, FOR SOLUTION INTRAVENOUS at 02:46

## 2018-12-14 RX ADMIN — LEVETIRACETAM 1000 MG: 500 TABLET ORAL at 08:36

## 2018-12-14 RX ADMIN — SODIUM CHLORIDE, SODIUM LACTATE, POTASSIUM CHLORIDE, AND CALCIUM CHLORIDE 100 ML/HR: 600; 310; 30; 20 INJECTION, SOLUTION INTRAVENOUS at 08:25

## 2018-12-14 RX ADMIN — ONDANSETRON 4 MG: 2 INJECTION INTRAMUSCULAR; INTRAVENOUS at 02:50

## 2018-12-14 RX ADMIN — LACOSAMIDE 150 MG: 50 TABLET, FILM COATED ORAL at 08:36

## 2018-12-14 RX ADMIN — KETOROLAC TROMETHAMINE 15 MG: 30 INJECTION, SOLUTION INTRAMUSCULAR at 11:52

## 2018-12-14 RX ADMIN — KETOROLAC TROMETHAMINE 15 MG: 30 INJECTION, SOLUTION INTRAMUSCULAR at 06:08

## 2018-12-14 RX ADMIN — OXYCODONE AND ACETAMINOPHEN 2 TABLET: 5; 325 TABLET ORAL at 02:49

## 2018-12-14 NOTE — ANESTHESIA PREPROCEDURE EVALUATION
Anesthetic History   No history of anesthetic complications            Review of Systems / Medical History  Patient summary reviewed, nursing notes reviewed and pertinent labs reviewed    Pulmonary  Within defined limits                 Neuro/Psych     seizures: well controlled  CVA  Psychiatric history     Cardiovascular    Hypertension: well controlled              Exercise tolerance: >4 METS     GI/Hepatic/Renal     GERD: well controlled           Endo/Other        Arthritis     Other Findings              Physical Exam    Airway  Mallampati: II  TM Distance: > 6 cm  Neck ROM: normal range of motion   Mouth opening: Normal     Cardiovascular  Regular rate and rhythm,  S1 and S2 normal,  no murmur, click, rub, or gallop             Dental  No notable dental hx       Pulmonary  Breath sounds clear to auscultation               Abdominal  GI exam deferred       Other Findings            Anesthetic Plan    ASA: 2  Anesthesia type: general          Induction: Intravenous  Anesthetic plan and risks discussed with: Patient

## 2018-12-14 NOTE — OP NOTES
[facility]  OPERATIVE REPORT    Georgiana Hartman  MR#: [id number]  : 1970  ACCOUNT #: [de-identified]   DATE OF SERVICE: 2018    SURGEON:  Gisel Greene MD      ASSISTANT:  Maurizio Jesus. ANESTHESIA:  General.    PREOPERATIVE DIAGNOSIS:  Cholelithiasis, cholecystitis. POSTOPERATIVE DIAGNOSIS:  Cholelithiasis, cholecystitis. PROCEDURE PERFORMED:  Laparoscopic cholecystectomy. ESTIMATED BLOOD LOSS:  30     SPECIMENS REMOVED:  gallbladder    COMPLICATIONS:  none     IMPLANTS:  none      CLINICAL INDICATION:  The patient is a 49-year-old female who presented complaining of abdominal pain. Workup revealed thickened gallbladder and gallstones. She, therefore, came to the operating room today for a laparoscopic cholecystectomy. Informed consent was obtained. DESCRIPTION OF PROCEDURE:  The patient was brought to the operating room and placed on the operative table in supine position. Endotracheal anesthesia was then established. She was then prepped and draped in the usual sterile fashion. A 5 mm supraumbilical incision was then made and a 5 mm Optiview trocar was then placed. The abdomen was then insufflated to 15 mmHg. Two additional 5 mm and one 12 mm trocar were then placed. The gallbladder was identified and grabbed. The triangle of Calot was then dissected out. A cystic artery was identified. This was circumferentially dissected out, clipped, and cut. The cystic duct was much more stuck. This had to be slowly dissected out. It seemed that in the more distal portion of the cystic duct there may have been a stone. Therefore, we continued to dissect the cystic duct and until we had a large amount of area that we were able to circumferentially dissect out. Once that was done, clips were placed and we were clearly on the cystic duct, clips were placed both proximally and distally and this was cut.   We then elevated the gallbladder off the hepatic fossa using cautery. The gallbladder was thus amputated, placed in an endobag and removed via the 12 mm trocar site. The abdomen was irrigated. No further bleeding was identified. The 12 mm trocar was removed and the fascia was closed using 0 Vicryl suture and the Endo Fascial closure device. The abdomen was then desufflated. The other trocars removed, and the skin was closed using 4-0 Monocryl in subcuticular fashion. Mastisol, Steri-Strips, 2 x 2's, and Tegaderms were applied. The patient was awoken in the OR and taken to the PACU in stable condition.       MD QUETA Da Silva / GEMMA  D: 12/13/2018 23:03     T: 12/14/2018 10:04  JOB #: [id number]

## 2018-12-14 NOTE — PROGRESS NOTES
General Surgery Daily Progress Note    Admit Date: 2018  Post-Operative Day: 1 Day Post-Op from Procedure(s):  CHOLECYSTECTOMY LAPAROSCOPIC     Subjective:     Last 24 hrs: Doing well this morning. C/o some left-sided abdominal pain--better with ice pack & PO Percocet. Tolerated clear liquids for breakfast--no NV. Voiding. +flatus      Objective:     Blood pressure 118/86, pulse 65, temperature 97.6 °F (36.4 °C), resp. rate 16, height 5' 1.5\" (1.562 m), weight 173 lb 8 oz (78.7 kg), SpO2 93 %. Temp (24hrs), Av.8 °F (36.6 °C), Min:97.6 °F (36.4 °C), Max:98.3 °F (36.8 °C)      _____________________  Physical Exam:     Alert and Oriented, cooperative, in no acute distress. Cardiovascular: RRR, no peripheral edema  Lungs:no resp distress  Abdomen: soft, flat. +BS  Appro TTP along lap sites.       Assessment:   Active Problems:    Cholecystitis (2018)            Plan:     D/c to home midday  F/up in office 2 weeks    Data Review:    Recent Labs     18  0313   WBC 8.5   HGB 14.4   HCT 43.9        Recent Labs     18  0615 18  0313   NA  --  140   K  --  3.4*   CL  --  101   CO2  --  29   GLU  --  119*   BUN  --  17   CREA  --  0.70   CA  --  9.3   ALB  --  3.8   SGOT  --  20   ALT  --  30   INR 0.9  --      Recent Labs     18  0313   LPSE 149           ______________________  Medications:    Current Facility-Administered Medications   Medication Dose Route Frequency    lactated Ringers infusion  100 mL/hr IntraVENous CONTINUOUS    HYDROmorphone (DILAUDID) injection 0.5-1 mg  0.5-1 mg IntraVENous Q3H PRN    ondansetron (ZOFRAN) injection 4 mg  4 mg IntraVENous Q6H PRN    acetaminophen (TYLENOL) tablet 650 mg  650 mg Oral Q6H PRN    diphenhydrAMINE (BENADRYL) capsule 25 mg  25 mg Oral Q6H PRN    levETIRAcetam (KEPPRA) tablet 1,000 mg  1,000 mg Oral BID    lacosamide (VIMPAT) tablet 150 mg  150 mg Oral DAILY    lacosamide (VIMPAT) tablet 200 mg  200 mg Oral QHS    piperacillin-tazobactam (ZOSYN) 3.375 g in 0.9% sodium chloride (MBP/ADV) 100 mL  3.375 g IntraVENous Q8H    oxyCODONE-acetaminophen (PERCOCET) 5-325 mg per tablet 1-2 Tab  1-2 Tab Oral Q6H PRN    ketorolac (TORADOL) injection 15 mg  15 mg IntraVENous Q6H       Jacob Greer PA-C  12/14/2018

## 2018-12-14 NOTE — DISCHARGE INSTRUCTIONS
No heavy lifting more than 10-15 lbs  May remove outer dressings in 3 days  Leave the steri-strips in place  May shower in 2 days  No tub baths or swimming. May walk and climb stairs. Learning About Acute Cholecystitis  What is cholecystitis? Cholecystitis (say \"koh-lih-sis-TY-tus\") is inflammation of the gallbladder. The gallbladder stores bile. Bile helps the body digest food. Normally, the bile flows from the gallbladder to the small intestine. A gallstone stuck in the cystic duct is most often the cause of sudden (acute) cholecystitis. The cystic duct is the tube that carries the bile out of the gallbladder. The gallstone blocks the bile from leaving the gallbladder. This results in an irritated and swollen gallbladder. The disease can also be caused by infection or trauma, such as an injury from a car accident. Cholecystitis has to be treated right away. You will probably have to go to the hospital. Surgery is the usual treatment. What are the symptoms? Symptoms include:  · Steady and severe pain in the upper right part of belly. This is the most common symptom. The pain can sometimes move to your back or right shoulder blade. It may last for more than 6 hours. · Nausea or vomiting. · A fever. How is it treated? The main way to treat this disease is surgery to remove the gallbladder. This surgery can often be done through small cuts (incisions) in the belly. This is called a laparoscopic cholecystectomy. In some cases, you may need a more extensive surgery. You may need surgery as soon as possible. The doctor may try to reduce swelling and irritation in the gallbladder before removing it. You may be given fluids and antibiotics through an IV. You may also be given pain medicine. Follow-up care is a key part of your treatment and safety. Be sure to make and go to all appointments, and call your doctor if you are having problems.  It's also a good idea to know your test results and keep a list of the medicines you take. Where can you learn more? Go to http://marlon-zhen.info/. Enter T940 in the search box to learn more about \"Learning About Acute Cholecystitis. \"  Current as of: March 28, 2018  Content Version: 11.8  © 7505-0243 [employer]. Care instructions adapted under license by [employer] (which disclaims liability or warranty for this information). If you have questions about a medical condition or this instruction, always ask your healthcare professional. [address] any warranty or liability for your use of this information. Biliary Colic: Care Instructions  Your Care Instructions         Learning About Cholecystectomy  What is cholecystectomy  Cholecystectomy (say \"koh-sebastian-sis-JACQUE-tuh-racheal\") is surgery to remove the gallbladder and gallstones. The gallbladder stores bile made by your liver. The bile helps you digest fats. Gallstones are made of cholesterol and other things found in bile. Your body will work fine without a gallbladder. Bile will go straight from the liver to the intestine. There may be small changes in how you digest food. But you probably will not notice them. How is the surgery done? Cholecystectomy is usually laparoscopic surgery. To do this type of surgery, a doctor puts a lighted tube, or scope, and other surgical tools through small cuts (incisions) in your belly. The doctor is able to see your organs with the scope. After your gallbladder is removed, you will no longer have gallstones. The cuts leave scars that usually fade with time. Open surgery may be done if problems are found during laparoscopic surgery. With open surgery, the gallbladder is removed through one larger cut in your belly. And the hospital stay is longer. What can you expect after surgery? It is normal to feel weak and tired for several days after you return home. Your belly may be swollen.  If you had laparoscopic surgery, you may also have pain in your shoulder for about 24 hours. You may have gas or need to burp a lot at first.  A few people get diarrhea. The diarrhea usually goes away in 2 to 4 weeks. But it may last longer. How quickly you get better depends on which kind of surgery you had. For laparoscopic surgery, most people can go back to work or their normal routine in 1 to 2 weeks. It depends on the type of work you do and how you feel. If you have open surgery, it will probably take 4 to 6 weeks before you get back to your normal routine. Follow-up care is a key part of your treatment and safety. Be sure to make and go to all appointments, and call your doctor if you are having problems. It's also a good idea to know your test results and keep a list of the medicines you take. Where can you learn more? Go to http://marlon-zhen.info/. Enter N661 in the search box to learn more about \"Learning About Cholecystectomy. \"  Current as of: March 28, 2018  Content Version: 11.8  © 4748-7380 [employer]. Care instructions adapted under license by [employer] (which disclaims liability or warranty for this information). If you have questions about a medical condition or this instruction, always ask your healthcare professional. [address] any warranty or liability for your use of this information. Biliary (say \"BILL-ee-air-ee\") colic is belly pain caused by gallbladder problems. It is usually caused by a gallstone moving through or blocking the common bile duct or cystic duct. Gallstones are stones that form in the gallbladder. They are made of cholesterol and other substances. The gallbladder is a small sac located just under the liver. It stores bile released by the liver. Bile helps you digest fats. Gallstones also can form in the common bile duct or cystic duct.  These ducts carry bile from the gallbladder and the liver to the small intestine. Gallstones may be as small as a grain of sand or as large as a golf ball. Gallstones that cause severe symptoms usually are treated with surgery to remove the gallbladder. If the first attack of biliary colic is mild, it is often safe to wait until you have had another attack before you think about having surgery. The doctor has checked you carefully, but problems can develop later. If you notice any problems or new symptoms, get medical treatment right away. Follow-up care is a key part of your treatment and safety. Be sure to make and go to all appointments, and call your doctor if you are having problems. It's also a good idea to know your test results and keep a list of the medicines you take. How can you care for yourself at home? · Take pain medicines exactly as directed. ? If the doctor gave you a prescription medicine for pain, take it as prescribed. ? If you are not taking a prescription pain medicine, ask your doctor if you can take an over-the-counter medicine. Read and follow all instructions on the label. · Avoid foods that cause symptoms, especially fatty foods. These can cause biliary colic. · You may need more tests to look at your gallbladder. When should you call for help? Call your doctor now or seek immediate medical care if:    · You have a fever.     · You have new belly pain, or your pain gets worse.     · There is a new or increasing yellow tint to your skin or the whites of your eyes.     · Your urine is dark yellow-brown, or your stools are light-colored or white.     · You cannot keep down fluids.    Watch closely for changes in your health, and be sure to contact your doctor if:    · You do not get better as expected.     · You are not getting better after 1 day (24 hours). Where can you learn more? Go to http://marlon-zhen.info/. Enter E124 in the search box to learn more about \"Biliary Colic: Care Instructions. \"  Current as of: March 28, 2018  Content Version: 11.8  © 9164-5230 [employer]. Care instructions adapted under license by [employer] (which disclaims liability or warranty for this information). If you have questions about a medical condition or this instruction, always ask your healthcare professional. [address] any warranty or liability for your use of this information. Low-Fat Diet for Gallbladder Disease: Care Instructions  Your Care Instructions  When you eat, the gallbladder releases bile, which helps you digest the fat in food. If you have an inflamed gallbladder, this may cause pain. A low-fat diet may give your gallbladder a rest so you can start to heal. Your doctor and dietitian can help you make an eating plan that does not irritate your digestive system. Always talk with your doctor or dietitian before you make changes in your diet. Follow-up care is a key part of your treatment and safety. Be sure to make and go to all appointments, and call your doctor if you are having problems. It's also a good idea to know your test results and keep a list of the medicines you take. How can you care for yourself at home? · Eat many small meals and snacks each day instead of three large meals. · Choose lean meats. ? Eat no more than 5 to 6½ ounces of meat a day. ? Cut off all fat you can see. ? Eat chicken and turkey without the skin. ? Many types of fish, such as salmon, lake trout, tuna, and herring, provide healthy omega-3 fat. But, avoid fish canned in oil, such as sardines in olive oil. ? Bake, broil, or grill meats, poultry, or fish instead of frying them in butter or fat. · Drink or eat nonfat or low-fat milk, yogurt, cheese, or other milk products each day. ? Read the labels on cheeses, and choose those with less than 5 grams of fat an ounce. ? Try fat-free sour cream, cream cheese, or yogurt. ? Avoid cream soups and cream sauces on pasta.   ? Eat low-fat ice cream, frozen yogurt, or sorbet. Avoid regular ice cream.  · Eat whole-grain cereals, breads, crackers, rice, or pasta. Avoid high-fat foods such as croissants, scones, biscuits, waffles, doughnuts, muffins, granola, and high-fat breads. · Flavor your foods with herbs and spices (such as basil, tarragon, or mint), fat-free sauces, or lemon juice instead of butter. You can also use butter substitutes, fat-free mayonnaise, or fat-free dressing. · Try applesauce, prune puree, or mashed bananas to replace some or all of the fat when you bake. · Limit fats and oils, such as butter, margarine, mayonnaise, and salad dressing, to no more than 1 tablespoon a meal.  · Avoid high-fat foods, such as:  ? Chocolate, whole milk, ice cream, and processed cheese. ? Fried or buttered foods. ? Sausage, salami, and arce. ? Cinnamon rolls, cakes, pies, cookies, and other pastries. ? Prepared snack foods, such as potato chips, nut and granola bars, and mixed nuts. ? Coconut and avocado. · Learn how to read food labels for serving sizes and ingredients. Fast-food and convenience-food meals often have lots of fat. Where can you learn more? Go to http://marlon-zhen.info/. Enter X846 in the search box to learn more about \"Low-Fat Diet for Gallbladder Disease: Care Instructions. \"  Current as of: March 29, 2018  Content Version: 11.8  © 3956-4874 [employer]. Care instructions adapted under license by [employer] (which disclaims liability or warranty for this information). If you have questions about a medical condition or this instruction, always ask your healthcare professional. [address] any warranty or liability for your use of this information.

## 2018-12-14 NOTE — PROGRESS NOTES
Bedside and Verbal shift change report given to Earnest Rabago (oncoming nurse) by Gee Amezquita (offgoing nurse). Report included the following information SBAR, Kardex, ED Summary, Procedure Summary, Intake/Output, MAR and Recent Results.

## 2018-12-14 NOTE — PERIOP NOTES
TRANSFER - OUT REPORT:    [facility] Tanesha Brennan  being transferred to [facility](unit) for routine post - op       Report consisted of patients Situation, Background, Assessment and   Recommendations(SBAR). Time Pre op antibiotic given:2150  Anesthesia Stop time: 2308  Meyers Present on Transfer to floor:no  Order for Meyers on Chart:no  Discharge Prescriptions with Chartyes    Information from the following report(s) SBAR, OR Summary, Procedure Summary, Intake/Output, MAR, Recent Results, Med Rec Status and Cardiac Rhythm NSR was reviewed with the receiving nurse. Opportunity for questions and clarification was provided. Is the patient on 02? YES       L/Min 2       Other nc    Is the patient on a monitor? NO    Is the nurse transporting with the patient? NO    Surgical Waiting Area notified of patient's transfer from PACU? YES      The following personal items collected during your admission accompanied patient upon transfer:   Dental Appliance: Dental Appliances: None  Vision: Visual Aid: Glasses  Hearing Aid:    Jewelry: Jewelry: None  Clothing: Clothing: (no belongings to Preop)  Other Valuables:  Other Valuables: None  Valuables sent to safe:        \

## 2018-12-14 NOTE — ROUTINE PROCESS
TRANSFER - IN REPORT:    Verbal report received from [facility]) on Nabila Mustache  being received from [facility](unit) for ordered procedure      Report consisted of patients Situation, Background, Assessment and   Recommendations(SBAR). Information from the following report(s) SBAR, Kardex, ED Summary, Procedure Summary, Intake/Output, MAR and Recent Results was reviewed with the receiving nurse. Opportunity for questions and clarification was provided. Assessment completed upon patients arrival to unit and care assumed.

## 2018-12-14 NOTE — BRIEF OP NOTE
BRIEF OPERATIVE NOTE    Date of Procedure: 12/13/2018   Preoperative Diagnosis: CHOLELITHIASIS  Postoperative Diagnosis: CHOLELITHIASIS    Procedure(s):  CHOLECYSTECTOMY LAPAROSCOPIC  Surgeon(s) and Role:     Hoang Gomez MD - Primary         Surgical Assistant: Amanda Grijalva     Surgical Staff:  Circ-1: Jeyson Denson RN  Scrub Tech-1: Marcela Lin Portis  Surg Asst-1: Kd Carlin  Event Time In Time Out   Incision Start 2158    Incision Close 2157      Anesthesia: General   Estimated Blood Loss: 30  Specimens:   ID Type Source Tests Collected by Time Destination   1 : appendix Fresh Abdomen  Rajendra Hilton MD 12/13/2018 2215 Pathology      Findings: Gallstones   Complications: none   Implants: * No implants in log *

## 2018-12-14 NOTE — PROGRESS NOTES
Problem: Falls - Risk of  Goal: *Absence of Falls  Document Isidoro Fall Risk and appropriate interventions in the flowsheet.   Outcome: Progressing Towards Goal  Fall Risk Interventions:            Medication Interventions: Patient to call before getting OOB

## 2018-12-14 NOTE — PROGRESS NOTES
TRANSFER - OUT REPORT:    Verbal report given to Zee(name) on Ron Perez  being transferred to OR (unit) for ordered procedure       Report consisted of patients Situation, Background, Assessment and   Recommendations(SBAR). Information from the following report(s) SBAR, Kardex, Intake/Output and MAR was reviewed with the receiving nurse. Lines:   Peripheral IV 18 Right Antecubital (Active)   Site Assessment Clean, dry, & intact 2018  4:30 PM   Phlebitis Assessment 0 2018  4:30 PM   Infiltration Assessment 0 2018  4:30 PM   Dressing Status Clean, dry, & intact 2018  4:30 PM   Dressing Type Transparent 2018  4:30 PM   Hub Color/Line Status Patent; Flushed 2018  4:30 PM   Action Taken Open ports on tubing capped 2018  4:30 PM   Alcohol Cap Used Yes 2018  4:30 PM        Opportunity for questions and clarification was provided.       Patient transported with:  IV pole

## 2018-12-14 NOTE — PROGRESS NOTES
Bedside shift change report given to [address] [place] [address]  (oncoming nurse) by Gema Godoy  (offgoing nurse). Report included the following information SBAR, Kardex, Intake/Output and MAR.

## 2018-12-14 NOTE — ANESTHESIA POSTPROCEDURE EVALUATION
Post-Anesthesia Evaluation and Assessment    Patient: Kathya Giordano MRN: [id number]  SSN: [id number]    YOB: 1970  Age: 46 y.o. Sex: female      I have evaluated the patient and they are stable and ready for discharge from the PACU. Cardiovascular Function/Vital Signs  Visit Vitals  /55   Pulse 67   Temp 36.8 °C (98.3 °F)   Resp 18   Ht 5' 1.5\" (1.562 m)   Wt 78.7 kg (173 lb 8 oz)   SpO2 98%   BMI 32.25 kg/m²       Patient is status post General anesthesia for Procedure(s):  CHOLECYSTECTOMY LAPAROSCOPIC. Nausea/Vomiting: None    Postoperative hydration reviewed and adequate. Pain:  Pain Scale 1: Numeric (0 - 10) (12/13/18 2337)  Pain Intensity 1: 4 (12/13/18 2337)   Managed    Neurological Status:   Neuro (WDL): Exceptions to WDL (12/13/18 2337)  Neuro  Neurologic State: Alert;Drowsy (12/13/18 2337)  Orientation Level: Oriented X4 (12/13/18 2337)  Cognition: Follows commands (12/13/18 2337)  Speech: Clear (12/13/18 2337)  LUE Motor Response: Purposeful (12/13/18 2337)  LLE Motor Response: Purposeful (12/13/18 2337)  RUE Motor Response: Purposeful (12/13/18 2337)  RLE Motor Response: Purposeful (12/13/18 2337)   At baseline    Mental Status, Level of Consciousness: Alert and  oriented to person, place, and time    Pulmonary Status:   O2 Device: Nasal cannula (12/13/18 2337)   Adequate oxygenation and airway patent    Complications related to anesthesia: None    Post-anesthesia assessment completed.  No concerns    Signed By: Mata Fitzgerald MD     December 13, 2018              Procedure(s):  CHOLECYSTECTOMY LAPAROSCOPIC.    <BSHSIANPOST>    Visit Vitals  /55   Pulse 67   Temp 36.8 °C (98.3 °F)   Resp 18   Ht 5' 1.5\" (1.562 m)   Wt 78.7 kg (173 lb 8 oz)   SpO2 98%   BMI 32.25 kg/m²

## 2018-12-27 ENCOUNTER — OFFICE VISIT (OUTPATIENT)
Dept: SURGERY | Age: 48
End: 2018-12-27

## 2018-12-27 VITALS
WEIGHT: 171 LBS | DIASTOLIC BLOOD PRESSURE: 86 MMHG | HEART RATE: 79 BPM | SYSTOLIC BLOOD PRESSURE: 120 MMHG | HEIGHT: 62 IN | BODY MASS INDEX: 31.47 KG/M2 | OXYGEN SATURATION: 97 % | RESPIRATION RATE: 18 BRPM | TEMPERATURE: 97.7 F

## 2018-12-27 DIAGNOSIS — Z09 POSTOPERATIVE EXAMINATION: Primary | ICD-10-CM

## 2018-12-27 NOTE — PROGRESS NOTES
Pt complaining of some itching and pain from the LUQ incision. She is tolerating a diet. She did have some constipation but this has resolved. She complains of yeast infection which she treated with monistat    Visit Vitals  /86 (BP 1 Location: Left arm, BP Patient Position: Sitting)   Pulse 79   Temp 97.7 °F (36.5 °C) (Oral)   Resp 18   Ht 5' 1.5\" (1.562 m)   Wt 171 lb (77.6 kg)   SpO2 97%   BMI 31.79 kg/m²     Gen- Alert in NAD  ABD- S/nontender  Incisions healing well  Small rash around inicisions-     Path- Chronic Cholecystitis.      S/p Lap Melly  Doing well   May use lotrimin cream on incisions  Continue to limit physical activity for the next few weeks  F/u PRN

## 2019-10-18 ENCOUNTER — APPOINTMENT (OUTPATIENT)
Dept: CT IMAGING | Age: 49
End: 2019-10-18
Attending: EMERGENCY MEDICINE
Payer: COMMERCIAL

## 2019-10-18 ENCOUNTER — HOSPITAL ENCOUNTER (EMERGENCY)
Dept: MRI IMAGING | Age: 49
Discharge: HOME | End: 2019-10-18
Attending: EMERGENCY MEDICINE
Payer: COMMERCIAL

## 2019-10-18 ENCOUNTER — HOSPITAL ENCOUNTER (EMERGENCY)
Age: 49
Discharge: HOME | End: 2019-10-18
Attending: EMERGENCY MEDICINE
Payer: COMMERCIAL

## 2019-10-18 VITALS
HEART RATE: 78 BPM | RESPIRATION RATE: 20 BRPM | HEIGHT: 61 IN | TEMPERATURE: 98.5 F | BODY MASS INDEX: 32.47 KG/M2 | DIASTOLIC BLOOD PRESSURE: 55 MMHG | OXYGEN SATURATION: 94 % | WEIGHT: 171.96 LBS | SYSTOLIC BLOOD PRESSURE: 128 MMHG

## 2019-10-18 DIAGNOSIS — G40.919 BREAKTHROUGH SEIZURE (HCC): Primary | ICD-10-CM

## 2019-10-18 LAB
ALBUMIN SERPL-MCNC: 3.7 G/DL (ref 3.5–5)
ALBUMIN/GLOB SERPL: 1 {RATIO} (ref 1.1–2.2)
ALP SERPL-CCNC: 104 U/L (ref 45–117)
ALT SERPL-CCNC: 34 U/L (ref 12–78)
ANION GAP SERPL CALC-SCNC: 10 MMOL/L (ref 5–15)
APPEARANCE UR: CLEAR
AST SERPL-CCNC: 25 U/L (ref 15–37)
ATRIAL RATE: 77 BPM
BACTERIA URNS QL MICRO: NEGATIVE /HPF
BASOPHILS # BLD: 0 K/UL (ref 0–0.1)
BASOPHILS NFR BLD: 1 % (ref 0–1)
BILIRUB SERPL-MCNC: 0.5 MG/DL (ref 0.2–1)
BILIRUB UR QL: NEGATIVE
BUN SERPL-MCNC: 11 MG/DL (ref 6–20)
BUN/CREAT SERPL: 17 (ref 12–20)
CALCIUM SERPL-MCNC: 8.9 MG/DL (ref 8.5–10.1)
CALCULATED P AXIS, ECG09: 20 DEGREES
CALCULATED R AXIS, ECG10: 103 DEGREES
CALCULATED T AXIS, ECG11: 12 DEGREES
CHLORIDE SERPL-SCNC: 102 MMOL/L (ref 97–108)
CO2 SERPL-SCNC: 28 MMOL/L (ref 21–32)
COLOR UR: ABNORMAL
CREAT SERPL-MCNC: 0.66 MG/DL (ref 0.55–1.02)
DIAGNOSIS, 93000: NORMAL
DIFFERENTIAL METHOD BLD: NORMAL
EOSINOPHIL # BLD: 0.2 K/UL (ref 0–0.4)
EOSINOPHIL NFR BLD: 3 % (ref 0–7)
EPITH CASTS URNS QL MICRO: ABNORMAL /LPF
ERYTHROCYTE [DISTWIDTH] IN BLOOD BY AUTOMATED COUNT: 11.7 % (ref 11.5–14.5)
GLOBULIN SER CALC-MCNC: 3.6 G/DL (ref 2–4)
GLUCOSE BLD STRIP.AUTO-MCNC: 152 MG/DL (ref 65–100)
GLUCOSE SERPL-MCNC: 134 MG/DL (ref 65–100)
GLUCOSE UR STRIP.AUTO-MCNC: NEGATIVE MG/DL
HCT VFR BLD AUTO: 44.7 % (ref 35–47)
HGB BLD-MCNC: 15 G/DL (ref 11.5–16)
HGB UR QL STRIP: NEGATIVE
IMM GRANULOCYTES # BLD AUTO: 0 K/UL (ref 0–0.04)
IMM GRANULOCYTES NFR BLD AUTO: 0 % (ref 0–0.5)
INR PPP: 1 (ref 0.9–1.1)
KETONES UR QL STRIP.AUTO: NEGATIVE MG/DL
LEUKOCYTE ESTERASE UR QL STRIP.AUTO: NEGATIVE
LYMPHOCYTES # BLD: 1.8 K/UL (ref 0.8–3.5)
LYMPHOCYTES NFR BLD: 23 % (ref 12–49)
MCH RBC QN AUTO: 30.4 PG (ref 26–34)
MCHC RBC AUTO-ENTMCNC: 33.6 G/DL (ref 30–36.5)
MCV RBC AUTO: 90.5 FL (ref 80–99)
MONOCYTES # BLD: 0.5 K/UL (ref 0–1)
MONOCYTES NFR BLD: 6 % (ref 5–13)
NEUTS SEG # BLD: 5.3 K/UL (ref 1.8–8)
NEUTS SEG NFR BLD: 67 % (ref 32–75)
NITRITE UR QL STRIP.AUTO: NEGATIVE
NRBC # BLD: 0 K/UL (ref 0–0.01)
NRBC BLD-RTO: 0 PER 100 WBC
P-R INTERVAL, ECG05: 176 MS
PH UR STRIP: 8.5 [PH] (ref 5–8)
PLATELET # BLD AUTO: 224 K/UL (ref 150–400)
PMV BLD AUTO: 10.6 FL (ref 8.9–12.9)
POTASSIUM SERPL-SCNC: 3.4 MMOL/L (ref 3.5–5.1)
PROT SERPL-MCNC: 7.3 G/DL (ref 6.4–8.2)
PROT UR STRIP-MCNC: NEGATIVE MG/DL
PROTHROMBIN TIME: 9.4 SEC (ref 9–11.1)
Q-T INTERVAL, ECG07: 366 MS
QRS DURATION, ECG06: 100 MS
QTC CALCULATION (BEZET), ECG08: 414 MS
RBC # BLD AUTO: 4.94 M/UL (ref 3.8–5.2)
RBC #/AREA URNS HPF: ABNORMAL /HPF (ref 0–5)
SERVICE CMNT-IMP: ABNORMAL
SODIUM SERPL-SCNC: 140 MMOL/L (ref 136–145)
SP GR UR REFRACTOMETRY: 1.01 (ref 1–1.03)
UR CULT HOLD, URHOLD: NORMAL
UROBILINOGEN UR QL STRIP.AUTO: 0.2 EU/DL (ref 0.2–1)
VENTRICULAR RATE, ECG03: 77 BPM
WBC # BLD AUTO: 7.8 K/UL (ref 3.6–11)
WBC URNS QL MICRO: ABNORMAL /HPF (ref 0–4)

## 2019-10-18 PROCEDURE — 74011636320 HC RX REV CODE- 636/320

## 2019-10-18 PROCEDURE — 85610 PROTHROMBIN TIME: CPT

## 2019-10-18 PROCEDURE — 96374 THER/PROPH/DIAG INJ IV PUSH: CPT

## 2019-10-18 PROCEDURE — 80053 COMPREHEN METABOLIC PANEL: CPT

## 2019-10-18 PROCEDURE — 36415 COLL VENOUS BLD VENIPUNCTURE: CPT

## 2019-10-18 PROCEDURE — 81001 URINALYSIS AUTO W/SCOPE: CPT

## 2019-10-18 PROCEDURE — 70450 CT HEAD/BRAIN W/O DYE: CPT

## 2019-10-18 PROCEDURE — 70496 CT ANGIOGRAPHY HEAD: CPT

## 2019-10-18 PROCEDURE — 74011000258 HC RX REV CODE- 258: Performed by: EMERGENCY MEDICINE

## 2019-10-18 PROCEDURE — 93005 ELECTROCARDIOGRAM TRACING: CPT

## 2019-10-18 PROCEDURE — 82962 GLUCOSE BLOOD TEST: CPT

## 2019-10-18 PROCEDURE — 74011250636 HC RX REV CODE- 250/636: Performed by: EMERGENCY MEDICINE

## 2019-10-18 PROCEDURE — 85025 COMPLETE CBC W/AUTO DIFF WBC: CPT

## 2019-10-18 PROCEDURE — 94762 N-INVAS EAR/PLS OXIMTRY CONT: CPT

## 2019-10-18 PROCEDURE — 70551 MRI BRAIN STEM W/O DYE: CPT

## 2019-10-18 PROCEDURE — 99285 EMERGENCY DEPT VISIT HI MDM: CPT

## 2019-10-18 RX ORDER — SODIUM CHLORIDE 9 MG/ML
50 INJECTION, SOLUTION INTRAVENOUS
Status: COMPLETED | OUTPATIENT
Start: 2019-10-18 | End: 2019-10-18

## 2019-10-18 RX ORDER — LEVETIRACETAM 500 MG/1
1500 TABLET ORAL 2 TIMES DAILY
Qty: 60 TAB | Refills: 0 | Status: SHIPPED | OUTPATIENT
Start: 2019-10-18

## 2019-10-18 RX ORDER — SODIUM CHLORIDE 9 MG/ML
10 INJECTION INTRAMUSCULAR; INTRAVENOUS; SUBCUTANEOUS ONCE
Status: COMPLETED | OUTPATIENT
Start: 2019-10-18 | End: 2019-10-18

## 2019-10-18 RX ORDER — SODIUM CHLORIDE 0.9 % (FLUSH) 0.9 %
5-40 SYRINGE (ML) INJECTION EVERY 8 HOURS
Status: DISCONTINUED | OUTPATIENT
Start: 2019-10-18 | End: 2019-10-18 | Stop reason: HOSPADM

## 2019-10-18 RX ORDER — SODIUM CHLORIDE 0.9 % (FLUSH) 0.9 %
5-40 SYRINGE (ML) INJECTION AS NEEDED
Status: DISCONTINUED | OUTPATIENT
Start: 2019-10-18 | End: 2019-10-18 | Stop reason: HOSPADM

## 2019-10-18 RX ADMIN — LEVETIRACETAM 500 MG: 100 INJECTION, SOLUTION INTRAVENOUS at 16:18

## 2019-10-18 RX ADMIN — IOPAMIDOL 100 ML: 755 INJECTION, SOLUTION INTRAVENOUS at 15:39

## 2019-10-18 RX ADMIN — SODIUM CHLORIDE 10 ML: 9 INJECTION INTRAMUSCULAR; INTRAVENOUS; SUBCUTANEOUS at 15:40

## 2019-10-18 RX ADMIN — SODIUM CHLORIDE 50 ML: 900 INJECTION, SOLUTION INTRAVENOUS at 15:40

## 2019-10-18 NOTE — ED TRIAGE NOTES
Pt. States she started feeling dizzy at 1330. Not worse when she turns head. Pt. Denies any N/V. Pt. Has residual weakness on right side and has some right handed contracture that she is able to straighten out but states she has it when she's having seizures sometimes as well as leg issue and today had right leg straightened out. Leora Harmon

## 2019-10-18 NOTE — ED PROVIDER NOTES
The history is provided by the patient. Extremity Weakness    This is a new problem. The current episode started 1 to 2 hours ago. The problem occurs constantly. The problem has not changed since onset. The pain is present in the right lower leg and right hand. The patient is experiencing no pain. Associated symptoms include stiffness and tingling (of right face, lower arm, lower leg and body ). Associated symptoms comments: Nonspecific vertigo symptoms. She has tried nothing for the symptoms. There has been no history of extremity trauma. Past Medical History:   Diagnosis Date    Arthritis     neck and wrist    Chronic pain     DJD Cervical spine    GERD (gastroesophageal reflux disease)     Ill-defined condition     erlos danlos    Psychiatric disorder     Depression, anxiety    Seizures ([facility] [address]     simple partial seizures last one beginning of June 2016    Stroke [facility]) 1996    Residual Right Sided Weakness    Thromboembolus [facility])     with stroke       Past Surgical History:   Procedure Laterality Date    ABDOMEN SURGERY PROC UNLISTED  1987    Open Appendectomy    HX ABDOMINOPLASTY  2015    abdominoplasty    HX APPENDECTOMY  1987    HX [address] 1995 & 2003    HX CHOLECYSTECTOMY  12/13/2018    [facility]OGL[facility]. Royal Pearson    HX GYN  5599'T    Laparoscopic lysis of adhesions    HX GYN  2004    Uterine Ablation    HX ORTHOPAEDIC Left 1993 & 2002    ORIF left fractured wrist, implants removed in 2002    HX ORTHOPAEDIC Right     Fusion-right foot, toes 2-5 due to contractures from residual weakness from stroke    HX TONSILLECTOMY           No family history on file.     Social History     Socioeconomic History    Marital status:      Spouse name: Not on file    Number of children: Not on file    Years of education: Not on file    Highest education level: Not on file   Occupational History    Not on file   Social Needs    Financial resource strain: Not on file   Daniel-Niurka insecurity:     Worry: Not on file     Inability: Not on file    Transportation needs:     Medical: Not on file     Non-medical: Not on file   Tobacco Use    Smoking status: Never Smoker    Smokeless tobacco: Never Used   Substance and Sexual Activity    Alcohol use: Yes     Alcohol/week: 2.0 standard drinks     Types: 2 Glasses of wine per week     Comment: Rarely with dinner/once a week    Drug use: No    Sexual activity: Not on file   Lifestyle    Physical activity:     Days per week: Not on file     Minutes per session: Not on file    Stress: Not on file   Relationships    Social connections:     Talks on phone: Not on file     Gets together: Not on file     Attends Orthodoxy service: Not on file     Active member of club or organization: Not on file     Attends meetings of clubs or organizations: Not on file     Relationship status: Not on file    Intimate partner violence:     Fear of current or ex partner: Not on file     Emotionally abused: Not on file     Physically abused: Not on file     Forced sexual activity: Not on file   Other Topics Concern    Not on file   Social History Narrative    Not on file         ALLERGIES: Patient has no known allergies. Review of Systems   Musculoskeletal: Positive for stiffness. Neurological: Positive for tingling (of right face, lower arm, lower leg and body ) and weakness. All other systems reviewed and are negative. Vitals:    10/18/19 1510   Pulse: 80   Resp: 16   Temp: 98.5 °F (36.9 °C)   SpO2: 97%   Weight: 78 kg (171 lb 15.3 oz)   Height: 5' 1\" (1.549 m)            Physical Exam   Constitutional: She is oriented to person, place, and time. She appears well-developed and well-nourished. No distress. HENT:   Head: Normocephalic and atraumatic. Eyes: Conjunctivae are normal.   Neck: Neck supple. Cardiovascular: Normal rate, regular rhythm and normal heart sounds.    Pulmonary/Chest: Effort normal and breath sounds normal. No respiratory distress. Abdominal: She exhibits no distension. Musculoskeletal: Normal range of motion. She exhibits no deformity. Neurological: She is alert and oriented to person, place, and time. She has normal strength. No cranial nerve deficit or sensory deficit. Coordination normal.   Right hand paresis with 3rd and 4th digits in flexion. Right ankle valgus paresis. Normal finger to nose testing and rapid alternating movement    Skin: Skin is warm and dry. Psychiatric: Her speech is normal and behavior is normal.   Nursing note and vitals reviewed. MDM     49-year-old female presents with a right body and face paresthesia sensation that occurred 1-1/2 hours prior to arrival in the emergency department accompanied by increased weakness of her right hand and foot which she sometimes gets with seizures related to an MCA stroke she had remotely. She tells me that this was the result of a vertebral dissection on the left side which resolved. She has some accompanying vertigo symptoms that are not reproducible on movement here. Her deficits are chronic on the right arm and leg but appears slightly worse than normal for her so she was activated as a level 1 stroke. Her deficits are not severe enough to warrant TPA administration currently and this may be related to seizure activity. I discussed with Dr. Sheila Diallo who will see the patient in consultation after CTA. MRI is negative for acute infarct. Prescription written for increased dose of Keppra per neurology recommendations. She is feeling better without any major interventions here. No signs of triggers except for some sleep deprivation which we discussed. Return precautions discussed for worsening or new concerning symptoms. Procedures    EKG 1519: Rate 77, normal EKG, normal sinus rhythm, No ST segment or T wave abnormalities. No significant change from last tracing. Kaylan Cooper 3:59 PM  Radiology called, no LVO noted, no acute infarct noted.     4:12 PM  Discussed with Dr Robert Fournier, consult for neurology. I reviewed available results and clinical information. They recommend Keppra  mg x1 and increase keppra home dose to 1500 BID for presumed breakthrough seizure activity. They also recommend MRI prior to discharge for definitive stroke rule out.

## 2021-03-10 ENCOUNTER — APPOINTMENT (OUTPATIENT)
Dept: GENERAL RADIOLOGY | Age: 51
End: 2021-03-10
Attending: EMERGENCY MEDICINE
Payer: COMMERCIAL

## 2021-03-10 ENCOUNTER — APPOINTMENT (OUTPATIENT)
Dept: ULTRASOUND IMAGING | Age: 51
End: 2021-03-10
Attending: EMERGENCY MEDICINE
Payer: COMMERCIAL

## 2021-03-10 ENCOUNTER — HOSPITAL ENCOUNTER (EMERGENCY)
Age: 51
Discharge: HOME | End: 2021-03-10
Attending: EMERGENCY MEDICINE | Admitting: EMERGENCY MEDICINE
Payer: COMMERCIAL

## 2021-03-10 VITALS
BODY MASS INDEX: 33.03 KG/M2 | DIASTOLIC BLOOD PRESSURE: 69 MMHG | SYSTOLIC BLOOD PRESSURE: 125 MMHG | OXYGEN SATURATION: 97 % | WEIGHT: 174.82 LBS

## 2021-03-10 DIAGNOSIS — M71.22 BAKER'S CYST OF KNEE, LEFT: ICD-10-CM

## 2021-03-10 DIAGNOSIS — M17.12 ARTHRITIS OF LEFT KNEE: Primary | ICD-10-CM

## 2021-03-10 DIAGNOSIS — M25.462 EFFUSION OF LEFT KNEE: ICD-10-CM

## 2021-03-10 PROCEDURE — 73562 X-RAY EXAM OF KNEE 3: CPT

## 2021-03-10 PROCEDURE — 75810000275 HC EMERGENCY DEPT VISIT NO LEVEL OF CARE

## 2021-03-10 PROCEDURE — 93971 EXTREMITY STUDY: CPT

## 2021-03-10 PROCEDURE — 99283 EMERGENCY DEPT VISIT LOW MDM: CPT

## 2021-03-10 PROCEDURE — 74011250637 HC RX REV CODE- 250/637: Performed by: EMERGENCY MEDICINE

## 2021-03-10 RX ORDER — NAPROXEN 250 MG/1
500 TABLET ORAL
Status: COMPLETED | OUTPATIENT
Start: 2021-03-10 | End: 2021-03-10

## 2021-03-10 RX ORDER — NAPROXEN 500 MG/1
500 TABLET ORAL
Qty: 20 TAB | Refills: 0 | Status: SHIPPED | OUTPATIENT
Start: 2021-03-10 | End: 2021-03-20

## 2021-03-10 RX ADMIN — NAPROXEN 500 MG: 250 TABLET ORAL at 15:27

## 2021-03-10 NOTE — ED NOTES
Patient discharged with vital signs stable, AVS and prescriptions, in no acute distress.     Ace wrapped knee, ambulatory out of dept

## 2021-03-10 NOTE — ED TRIAGE NOTES
TRIAGE NOTE: Left knee swelling and pain. Saturday pain started and Sunday knee swelling. Pt able to walk with limp. Reports knot on outer left knee. Took tylenol PM last night.

## 2021-03-12 NOTE — ED PROVIDER NOTES
57-year-old female with a history of prior CVA with residual right-sided weakness, DJD, arthritis in her neck and wrist, EDS, seizure, presents to the emergency department noting increasing left knee swelling and pain since Saturday. She states that she has noted some increasing swelling predominantly only at the top and lateral aspect as well as behind her knee and her popliteal fossa. Denies any pain or swelling distally around her ankle. Denies any redness or warmth to the area or purulent drainage. No trauma to the area. She denies any history of prior DVT, on no blood thinners. She took a dose of Tylenol last nigh to no significant avail of her symptoms. No history of gout and she states that she does not regularly eat things like red meat, shellfish, or beer. .           Past Medical History:   Diagnosis Date    Arthritis     neck and wrist    Chronic pain     DJD Cervical spine    GERD (gastroesophageal reflux disease)     Ill-defined condition     erlophoebe campoverde    Psychiatric disorder     Depression, anxiety    Seizures ([facility] [address]     simple partial seizures last one beginning of June 2016    Stroke [facility]) 1996    Residual Right Sided Weakness    Thromboembolus [facility])     with stroke       Past Surgical History:   Procedure Laterality Date    HX ABDOMINOPLASTY  2015    abdominoplasty    HX APPENDECTOMY  1987    [address] [facility], 1995 & 2003    HX CHOLECYSTECTOMY  12/13/2018    [facility]CL[facility]. Marcelino Watkins    HX GYN  3283'T    Laparoscopic lysis of adhesions    HX GYN  2004    Uterine Ablation    HX ORTHOPAEDIC Left 1993 & 2002    ORIF left fractured wrist, implants removed in 2002    HX ORTHOPAEDIC Right     Fusion-right foot, toes 2-5 due to contractures from residual weakness from stroke    HX TONSILLECTOMY      [address]    Open Appendectomy         History reviewed. No pertinent family history.     Social History     Socioeconomic History    Marital status:      Spouse name: Not on file    Number of children: Not on file    Years of education: Not on file    Highest education level: Not on file   Occupational History    Not on file   Social Needs    Financial resource strain: Not on file    Food insecurity     Worry: Not on file     Inability: Not on file    Transportation needs     Medical: Not on file     Non-medical: Not on file   Tobacco Use    Smoking status: Never Smoker    Smokeless tobacco: Never Used   Substance and Sexual Activity    Alcohol use: Yes     Alcohol/week: 2.0 standard drinks     Types: 2 Glasses of wine per week     Comment: Rarely with dinner/once a week    Drug use: No    Sexual activity: Not on file   Lifestyle    Physical activity     Days per week: Not on file     Minutes per session: Not on file    Stress: Not on file   Relationships    Social connections     Talks on phone: Not on file     Gets together: Not on file     Attends Christian service: Not on file     Active member of club or organization: Not on file     Attends meetings of clubs or organizations: Not on file     Relationship status: Not on file    Intimate partner violence     Fear of current or ex partner: Not on file     Emotionally abused: Not on file     Physically abused: Not on file     Forced sexual activity: Not on file   Other Topics Concern    Not on file   Social History Narrative    Not on file         ALLERGIES: Patient has no known allergies. Review of Systems   Constitutional: Positive for activity change. Negative for appetite change, chills and fever. HENT: Negative for congestion, rhinorrhea, sinus pressure, sneezing and sore throat. Eyes: Negative for photophobia and visual disturbance. Respiratory: Negative for cough and shortness of breath. Cardiovascular: Negative for chest pain. Gastrointestinal: Negative for abdominal pain, blood in stool, constipation, diarrhea, nausea and vomiting.    Genitourinary: Negative for difficulty urinating, dysuria, flank pain, frequency, hematuria, menstrual problem, urgency, vaginal bleeding and vaginal discharge. Musculoskeletal: Positive for arthralgias (Left knee) and joint swelling. Negative for back pain, myalgias and neck pain. Skin: Negative for rash and wound. Neurological: Negative for syncope, weakness, numbness and headaches. Psychiatric/Behavioral: Negative for self-injury and suicidal ideas. All other systems reviewed and are negative. Vitals:    03/10/21 1434 03/10/21 1530   BP:  125/69   SpO2:  97%   Weight: 79.3 kg (174 lb 13.2 oz)             Physical Exam  Vitals signs and nursing note reviewed. Constitutional:       General: She is not in acute distress. Appearance: Normal appearance. She is well-developed. She is not diaphoretic. HENT:      Head: Normocephalic and atraumatic. Nose: Nose normal.   Eyes:      Extraocular Movements: Extraocular movements intact. Conjunctiva/sclera: Conjunctivae normal.      Pupils: Pupils are equal, round, and reactive to light. Neck:      Musculoskeletal: Neck supple. Cardiovascular:      Rate and Rhythm: Normal rate and regular rhythm. Heart sounds: Normal heart sounds. Pulmonary:      Effort: Pulmonary effort is normal.      Breath sounds: Normal breath sounds. Abdominal:      General: There is no distension. Palpations: Abdomen is soft. Tenderness: There is no abdominal tenderness. Musculoskeletal:         General: Swelling and tenderness present. No deformity or signs of injury. Left knee: She exhibits decreased range of motion (Mild, pain at extremes of range of motion and with weightbearing), swelling and effusion. She exhibits no ecchymosis, no deformity, no laceration, no erythema, normal alignment, no LCL laxity and no MCL laxity. Skin:     General: Skin is warm and dry. Neurological:      General: No focal deficit present.       Mental Status: She is alert and oriented to person, place, and time. Cranial Nerves: No cranial nerve deficit. Sensory: No sensory deficit. Motor: No weakness. Coordination: Coordination normal.          MDM   57-year-old female with left knee pain and swelling no redness or warmth or fever to suggest infectious etiology or gout. No history of trauma. X-ray of left knee shows no fracture but does show a joint effusion as well as old lateral collateral ligament injury with calcification to this area. US shows no evidence of DVT. Feel the symptoms are likely secondary to arthritis with associated effusion and Baker's cyst.  Given dose of Naprosyn in the ED and Rx course of the same recommended elevation and knee brace to help support. Feel that this arthritis likely secondary to putting more weight on her left lower extremity with ambulation secondary to her prior CVA with residual right-sided deficits. She is recommended orthopedics follow-up for further evaluation of her symptoms and return precautions were given for worsening or concerns. This plan was discussed with the patient at the bedside and she stated both understanding and agreement.   Procedures

## 2021-12-01 ENCOUNTER — OFFICE VISIT (OUTPATIENT)
Dept: ORTHOPEDIC SURGERY | Age: 51
End: 2021-12-01
Payer: COMMERCIAL

## 2021-12-01 DIAGNOSIS — M25.462 EFFUSION OF LEFT KNEE: Primary | ICD-10-CM

## 2021-12-01 PROCEDURE — 20610 DRAIN/INJ JOINT/BURSA W/O US: CPT | Performed by: ORTHOPAEDIC SURGERY

## 2021-12-01 PROCEDURE — 99214 OFFICE O/P EST MOD 30 MIN: CPT | Performed by: ORTHOPAEDIC SURGERY

## 2021-12-01 RX ORDER — BUPIVACAINE HYDROCHLORIDE 2.5 MG/ML
5 INJECTION, SOLUTION INFILTRATION; PERINEURAL ONCE
Status: COMPLETED | OUTPATIENT
Start: 2021-12-01 | End: 2021-12-01

## 2021-12-01 RX ORDER — METHYLPREDNISOLONE ACETATE 80 MG/ML
80 INJECTION, SUSPENSION INTRA-ARTICULAR; INTRALESIONAL; INTRAMUSCULAR; SOFT TISSUE ONCE
Status: COMPLETED | OUTPATIENT
Start: 2021-12-01 | End: 2021-12-01

## 2021-12-01 RX ADMIN — BUPIVACAINE HYDROCHLORIDE 12.5 MG: 2.5 INJECTION, SOLUTION INFILTRATION; PERINEURAL at 18:49

## 2021-12-01 RX ADMIN — METHYLPREDNISOLONE ACETATE 80 MG: 80 INJECTION, SUSPENSION INTRA-ARTICULAR; INTRALESIONAL; INTRAMUSCULAR; SOFT TISSUE at 18:49

## 2021-12-01 NOTE — PROGRESS NOTES
Winter Sharpe (: 1970) is a 46 y.o. female, patient, here for evaluation of the following chief complaint(s):  Knee Pain (left knee pain)       HPI:    Patient presents the office today with discomfort in the of the left knee. This is a patient who is status post arthroscopy of the knee. Out of nowhere, she started developing some discomfort of the knee. She is here today stating the pain is mostly located in the center portion of the knee. She feels like it kind of gives way from time to time. She has had a prior knee arthroscopy for medial meniscectomy and arthritis of the patella    No Known Allergies    Current Outpatient Medications   Medication Sig    levETIRAcetam (KEPPRA) 500 mg tablet Take 3 Tabs by mouth two (2) times a day.  amlodipine besylate (AMLODIPINE PO) Take  by mouth.  lacosamide (VIMPAT) 100 mg tab tablet Take 100 mg by mouth two (2) times a day. 1.5 tablet by mouth in morning and 2 tablets in afternoon    garlic 8,516 mg cap Take 1,000 mg by mouth daily.  multivitamin (ONE A DAY) tablet Take 1 Tab by mouth daily.  DULoxetine (CYMBALTA) 60 mg capsule Take 60 mg by mouth daily.  cholecalciferol, vitamin D3, (VITAMIN D3) 2,000 unit tab Take 2,000 Int'l Units by mouth two (2) times a day. No current facility-administered medications for this visit.        Past Medical History:   Diagnosis Date    Arthritis     neck and wrist    Chronic pain     DJD Cervical spine    GERD (gastroesophageal reflux disease)     Ill-defined condition     erlos danchava    Psychiatric disorder     Depression, anxiety    Seizures ([facility] [address]     simple partial seizures last one beginning of 2016    Stroke [facility]) 1996    Residual Right Sided Weakness    Thromboembolus [facility])     with stroke        Past Surgical History:   Procedure Laterality Date    HX ABDOMINOPLASTY      abdominoplasty    HX APPENDECTOMY      [address],  &     HX CHOLECYSTECTOMY 12/13/2018    [facility]. Erica Parekh    HX GYN  1035'A    Laparoscopic lysis of adhesions    HX GYN  2004    Uterine Ablation    HX ORTHOPAEDIC Left 1993 & 2002    ORIF left fractured wrist, implants removed in 2002    HX ORTHOPAEDIC Right     Fusion-right foot, toes 2-5 due to contractures from residual weakness from stroke    HX TONSILLECTOMY      [place] ABDOMEN SURGERY PROC UNLISTED  1987    Open Appendectomy       No family history on file. Social History     Socioeconomic History    Marital status:      Spouse name: Not on file    Number of children: Not on file    Years of education: Not on file    Highest education level: Not on file   Occupational History    Not on file   Tobacco Use    Smoking status: Never Smoker    Smokeless tobacco: Never Used   Substance and Sexual Activity    Alcohol use: Yes     Alcohol/week: 2.0 standard drinks     Types: 2 Glasses of wine per week     Comment: Rarely with dinner/once a week    Drug use: No    Sexual activity: Not on file   Other Topics Concern    Not on file   Social History Narrative    Not on file     Social Determinants of Health     Financial Resource Strain:     Difficulty of Paying Living Expenses: Not on file   Food Insecurity:     Worried About Running Out of Food in the Last Year: Not on file    Sarah of Food in the Last Year: Not on file   Transportation Needs:     Lack of Transportation (Medical): Not on file    Lack of Transportation (Non-Medical):  Not on file   Physical Activity:     Days of Exercise per Week: Not on file    Minutes of Exercise per Session: Not on file   Stress:     Feeling of Stress : Not on file   Social Connections:     Frequency of Communication with Friends and Family: Not on file    Frequency of Social Gatherings with Friends and Family: Not on file    Attends Cheondoism Services: Not on file    Active Member of Clubs or Organizations: Not on file    Attends Club or Organization Meetings: Not on file    Marital Status: Not on file   Intimate Partner Violence:     Fear of Current or Ex-Partner: Not on file    Emotionally Abused: Not on file    Physically Abused: Not on file    Sexually Abused: Not on file   Housing Stability:     Unable to Pay for Housing in the Last Year: Not on file    Number of [place] in the Last Year: Not on file    Unstable Housing in the Last Year: Not on file       Review of Systems   Musculoskeletal:        Left knee pain       Vitals: There were no vitals taken for this visit. There is no height or weight on file to calculate BMI. Ortho Exam     Left knee: Portal sites of healed well. She has some small to moderate effusion. She has full range of motion of the knee. Mild discomfort of the patella. She has no tenderness to palpation along the medial aspect of the knee joint. No lateral joint tenderness is noted. Neurovascular examination is intact. Right knee: There is no abrasions, lacerations, ecchymosis or soft tissue swelling. No effusion is identified. There is no pain to palpation along the medial or lateral border of the patella. There is no pain or crepitation with manipulation of the patella. There is normal excursion of the patella. Patellar grind test is negative. Active and passive range of motion is full and does not cause pain or crepitation. There is no pain with palpation along the medial femoral epicondyle or medial tibia and no pain with palpation over the lateral femoral epicondyle. There is no medial or lateral joint line tenderness. Glendy's maneuver is negative. There is no collateral ligament instability. Anterior drawer, Lachman and posterior drawer are negative. There is no soft tissue swelling distally into the leg. Neurocirculatory examination is intact. 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   After discussing treatments, she is elected proceed with aspiration injection. Under sterile prep, 45 cc of sterile fluid was drained on the knee. She was injected today with 80 mg of Depo-Medrol and 5 cc of 0.25% Marcaine. She is to ice modify her activity. If this is not helpful, I am happy to see her back.         Diana Teixeira MD

## 2021-12-03 NOTE — PROGRESS NOTES
HPI: Roney Rose (: 1970) is a 46 y.o. female, patient, here for evaluation of the following chief complaint(s):    Hand Pain  Patient seen today to evaluate her wrist and hands. 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 She also had a left distal radius fracture surgery 29 years ago and then 18 years ago had hardware removed. She had complained of numbness and paresthesias in her arm and hand but no clicking or locking. She was diagnosed with carpal tunnel syndrome. EMG assessment on 10/27/2021 showed mild subclinical left ulnar neuropathy behind the elbow but no evidence of carpal tunnel or peripheral polyneuropathy. However her clinical examination was strongly suggestive of left carpal tunnel syndrome. An MRI study of the left wrist showed no evidence of AVN of the scaphoid although there was moderate to marked arthritis throughout the wrist articulations with partial tears of the scapholunate and lunotriquetral ligaments as well as tenosynovitis. Vitals: There were no vitals taken for this visit. There is no height or weight on file to calculate BMI. No Known Allergies    Current Outpatient Medications   Medication Sig    levETIRAcetam (KEPPRA) 500 mg tablet Take 3 Tabs by mouth two (2) times a day.  amlodipine besylate (AMLODIPINE PO) Take  by mouth.  lacosamide (VIMPAT) 100 mg tab tablet Take 100 mg by mouth two (2) times a day. 1.5 tablet by mouth in morning and 2 tablets in afternoon    garlic 2,664 mg cap Take 1,000 mg by mouth daily.  multivitamin (ONE A DAY) tablet Take 1 Tab by mouth daily.  DULoxetine (CYMBALTA) 60 mg capsule Take 60 mg by mouth daily.  cholecalciferol, vitamin D3, (VITAMIN D3) 2,000 unit tab Take 2,000 Int'l Units by mouth two (2) times a day. No current facility-administered medications for this visit.        Past Medical History: Diagnosis Date    Arthritis     neck and wrist    Chronic pain     DJD Cervical spine    GERD (gastroesophageal reflux disease)     Ill-defined condition     erchava campoverde    Psychiatric disorder     Depression, anxiety    Seizures (HCC)     simple partial seizures last one beginning of June 2016    Stroke [facility]) 1996    Residual Right Sided Weakness    Thromboembolus [facility])     with stroke        Past Surgical History:   Procedure Laterality Date    HX ABDOMINOPLASTY  2015    abdominoplasty    HX APPENDECTOMY  1987    [address], 1995 & 2003    HX CHOLECYSTECTOMY  12/13/2018    misty [id number]. Annmarie Garcia    HX GYN  4583'X    Laparoscopic lysis of adhesions    HX GYN  2004    Uterine Ablation    HX ORTHOPAEDIC Left 1993 & 2002    ORIF left fractured wrist, implants removed in 2002    HX ORTHOPAEDIC Right     Fusion-right foot, toes 2-5 due to contractures from residual weakness from stroke    HX TONSILLECTOMY      [place] ABDOMEN SURGERY PROC UNLISTED  1987    Open Appendectomy       History reviewed. No pertinent family history. Social History     Tobacco Use    Smoking status: Never Smoker    Smokeless tobacco: Never Used   Substance Use Topics    Alcohol use: Yes     Alcohol/week: 2.0 standard drinks     Types: 2 Glasses of wine per week     Comment: Rarely with dinner/once a week    Drug use: No        Review of Systems    ROS     Positive for: Musculoskeletal (hand pain)    Negative for: Constitutional, Gastrointestinal, Neurological, Skin, Genitourinary, HENT, Endocrine, Cardiovascular, Eyes, Respiratory, Psychiatric, Allergic/Imm, Heme/Lymph    Last edited by Shelbie Ma RN on 12/6/2021  4:40 PM. (History)             Physical Exam    Patient examination is indeed consistent with carpal tunnel syndrome with Tinel's more than Phalen's and nerve compression testing. However she does have more pain and crepitation with grind testing of her left thumb CMC joint. Negative Finkelstein. Right side has post stroke changes. Imaging:    XR Results (most recent):  Results from Appointment encounter on 12/06/21    XR HAND LT MIN 3 V    Narrative  3 x-ray views of the left hand AP, lateral oblique show changes previous from distal radius fracture surgery many years ago with a hint of dorsal angulation mild DRUJ arthritic change and more advanced STT greater than thumb CMC arthritis with some osteopenia. No fracture. ASSESSMENT/PLAN:  Below is the assessment and plan developed based on review of pertinent history, physical exam, labs, studies, and medications. Patient examination was consistent with left thumb CMC basal joint osteoarthritis. She tolerated injection therapy well on 12/6/2021. There is also concomitant left carpal tunnel clinically more than electrodiagnostic testing. Future consideration could include a left thumb CMC arthroplasty with carpal tunnel release. She wants to give this careful consideration since she had a stroke on the right side at the age of 30 and has less than normal use of her right hand. I plan to see her back in 4 to 6 weeks to check her progress. 1. Left wrist pain  -     XR HAND LT MIN 3 V; Future  -     DRAIN/INJECT SMALL JOINT/BURSA  -     lidocaine (XYLOCAINE) 20 mg/mL (2 %) injection 10 mg; 10 mg, IntraDERMal, ONCE, 1 dose, On Mon 12/6/21 at 1700  -     methylPREDNISolone acetate (DEPO-MEDROL) 40 mg/mL injection 40 mg; 40 mg, Intra artICUlar, ONCE, 1 dose, On Mon 12/6/21 at 1700  2. Primary osteoarthritis of first carpometacarpal joint of left hand    Discussed risks/benefits of cortisone injection and patient gave verbal consent. Under sterile conditions, the left thumb carpometacarpal joint was injected with 1 cc 1% Lidocaine and 1cc 40mg Depo Medrol, tolerated the procedure well. Return in about 4 weeks (around 1/3/2022). An electronic signature was used to authenticate this note.   -- Bartolome Torrez MD

## 2021-12-06 ENCOUNTER — OFFICE VISIT (OUTPATIENT)
Dept: ORTHOPEDIC SURGERY | Age: 51
End: 2021-12-06
Payer: COMMERCIAL

## 2021-12-06 DIAGNOSIS — M25.532 LEFT WRIST PAIN: Primary | ICD-10-CM

## 2021-12-06 DIAGNOSIS — M18.12 PRIMARY OSTEOARTHRITIS OF FIRST CARPOMETACARPAL JOINT OF LEFT HAND: ICD-10-CM

## 2021-12-06 PROCEDURE — 20600 DRAIN/INJ JOINT/BURSA W/O US: CPT | Performed by: ORTHOPAEDIC SURGERY

## 2021-12-06 PROCEDURE — 99213 OFFICE O/P EST LOW 20 MIN: CPT | Performed by: ORTHOPAEDIC SURGERY

## 2021-12-06 RX ORDER — LIDOCAINE HYDROCHLORIDE 20 MG/ML
10 INJECTION, SOLUTION INFILTRATION; PERINEURAL ONCE
Status: COMPLETED | OUTPATIENT
Start: 2021-12-06 | End: 2021-12-06

## 2021-12-06 RX ORDER — METHYLPREDNISOLONE ACETATE 40 MG/ML
40 INJECTION, SUSPENSION INTRA-ARTICULAR; INTRALESIONAL; INTRAMUSCULAR; SOFT TISSUE ONCE
Status: COMPLETED | OUTPATIENT
Start: 2021-12-06 | End: 2021-12-06

## 2021-12-06 RX ADMIN — METHYLPREDNISOLONE ACETATE 40 MG: 40 INJECTION, SUSPENSION INTRA-ARTICULAR; INTRALESIONAL; INTRAMUSCULAR; SOFT TISSUE at 17:14

## 2021-12-06 RX ADMIN — LIDOCAINE HYDROCHLORIDE 10 MG: 20 INJECTION, SOLUTION INFILTRATION; PERINEURAL at 17:13

## 2021-12-06 NOTE — PATIENT INSTRUCTIONS
Learning About Arthritis at the [facility] of the Thumb  What is it? Arthritis at the base of the thumb joint is wear and tear on the cartilage. Cartilage is a firm, thick, slippery tissue. It covers and protects the ends of bones where they meet to form a joint. When you have arthritis, there are changes in the cartilage that cause it to break down. The bones rub together and cause joint damage and pain. What causes it? Experts don't know what causes arthritis at the base of the thumb. But aging, a lot of use, an injury, or family history may play a part. What are the symptoms? Symptoms of arthritis at the base of the thumb include aching in your joint. Or the pain may feel burning or sharp. You may feel clicking, creaking, or catching in the joint. It may get stiff. You may have more pain and less strength when you pinch or  things. Symptoms may come and go, stay the same, or get worse over time. How is it diagnosed? Your doctor can often diagnose arthritis by asking you questions about your joint pain and other symptoms and examining you. You may also have X-rays and blood tests. Blood tests can help make sure another disease isn't causing your symptoms. How is it treated? Arthritis at the base of your thumb may be treated with rest, pain relievers, steroid medicines, using a brace or splint, and--in some cases--surgery. To help relieve pain in the joint, rest your sore hand. Switch hands for some activities. You can try heat and cold therapy, such as hot compresses, paraffin wax, cold packs, or ice massage. Your doctor may give you a splint to wear during some activities or when pain flares up. You can often manage mild or moderate arthritis pain with over-the-counter pain relievers. These include medicines that reduce swelling, such as ibuprofen or naproxen. You can also use acetaminophen. Sometimes these medicines are in creams that you can rub on your thumb and hand.  Your doctor may also prescribe other medicine for your pain. For some people, steroid shots may be an option. If none of the treatments work, your doctor may discuss surgery with you. Follow-up care is a key part of your treatment and safety. Be sure to make and go to all appointments, and call your doctor if you are having problems. It's also a good idea to know your test results and keep a list of the medicines you take. Where can you learn more? Go to http://www.gray.com/  Enter T110 in the search box to learn more about \"Learning About Arthritis at the [facility] of the Thumb. \"  Current as of: April 30, 2021               Content Version: 13.0  © 4821-8348 Healthwise, Inc[employer]. Care instructions adapted under license by [employer] (which disclaims liability or warranty for this information). If you have questions about a medical condition or this instruction, always ask your healthcare professional. [address] any warranty or liability for your use of this information.

## 2022-02-21 ENCOUNTER — OFFICE VISIT (OUTPATIENT)
Dept: ORTHOPEDIC SURGERY | Age: 52
End: 2022-02-21
Payer: COMMERCIAL

## 2022-02-21 DIAGNOSIS — M22.42 PATELLA, CHONDROMALACIA, LEFT: ICD-10-CM

## 2022-02-21 DIAGNOSIS — M25.462 EFFUSION OF LEFT KNEE: Primary | ICD-10-CM

## 2022-02-21 PROCEDURE — 99212 OFFICE O/P EST SF 10 MIN: CPT | Performed by: ORTHOPAEDIC SURGERY

## 2022-02-21 NOTE — LETTER
2/21/2022    Patient: Chanel Carias   YOB: 1970   Date of Visit: 2/21/2022     Mary Cuevas MD  [address] [facility] [address]  Via Fax: [phone]    Dear Mary Cuevas MD,      Thank you for referring Ms. Zen Whatley to [facility] for evaluation. My notes for this consultation are attached. If you have questions, please do not hesitate to call me. I look forward to following your patient along with you.       Sincerely,    Soraya Thakkar MD

## 2022-02-21 NOTE — PROGRESS NOTES
True Klinefelter (: 1970) is a 46 y.o. female, patient, here for evaluation of the following chief complaint(s):  Knee Pain (knee pain)       HPI:    Patient presents to the office today with recurrent discomfort of left knee. This is a patient status post arthroscopy. She has been noticing some swelling in the knee. Since making the appointment, the swelling has improved. She continues to note some level of on and off pain with going up and down steps and doing kneeling type activity. No Known Allergies    Current Outpatient Medications   Medication Sig    levETIRAcetam (KEPPRA) 500 mg tablet Take 3 Tabs by mouth two (2) times a day.  amlodipine besylate (AMLODIPINE PO) Take  by mouth.  lacosamide (VIMPAT) 100 mg tab tablet Take 100 mg by mouth two (2) times a day. 1.5 tablet by mouth in morning and 2 tablets in afternoon    garlic 3,225 mg cap Take 1,000 mg by mouth daily.  multivitamin (ONE A DAY) tablet Take 1 Tab by mouth daily.  DULoxetine (CYMBALTA) 60 mg capsule Take 60 mg by mouth daily.  cholecalciferol, vitamin D3, (VITAMIN D3) 2,000 unit tab Take 2,000 Int'l Units by mouth two (2) times a day. No current facility-administered medications for this visit.        Past Medical History:   Diagnosis Date    Arthritis     neck and wrist    Chronic pain     DJD Cervical spine    GERD (gastroesophageal reflux disease)     Ill-defined condition     erlos danchava    Psychiatric disorder     Depression, anxiety    Seizures ([facility] [address]     simple partial seizures last one beginning of 2016    Stroke [facility]) 1996    Residual Right Sided Weakness    Thromboembolus [facility])     with stroke        Past Surgical History:   Procedure Laterality Date    HX ABDOMINOPLASTY      abdominoplasty    HX APPENDECTOMY      HX  [address],  &     HX CHOLECYSTECTOMY  2018    lap [id number]. Yamilet Franco    HX GYN  6367'G    Laparoscopic lysis of adhesions    HX GYN  2004    Uterine Ablation    HX ORTHOPAEDIC Left 1993 & 2002    ORIF left fractured wrist, implants removed in 2002    HX ORTHOPAEDIC Right     Fusion-right foot, toes 2-5 due to contractures from residual weakness from stroke    HX TONSILLECTOMY      [place] ABDOMEN SURGERY PROC UNLISTED  1987    Open Appendectomy       No family history on file. Social History     Socioeconomic History    Marital status:      Spouse name: Not on file    Number of children: Not on file    Years of education: Not on file    Highest education level: Not on file   Occupational History    Not on file   Tobacco Use    Smoking status: Never Smoker    Smokeless tobacco: Never Used   Substance and Sexual Activity    Alcohol use: Yes     Alcohol/week: 2.0 standard drinks     Types: 2 Glasses of wine per week     Comment: Rarely with dinner/once a week    Drug use: No    Sexual activity: Not on file   Other Topics Concern    Not on file   Social History Narrative    Not on file     Social Determinants of Health     Financial Resource Strain:     Difficulty of Paying Living Expenses: Not on file   Food Insecurity:     Worried About Running Out of Food in the Last Year: Not on file    Sarah of Food in the Last Year: Not on file   Transportation Needs:     Lack of Transportation (Medical): Not on file    Lack of Transportation (Non-Medical):  Not on file   Physical Activity:     Days of Exercise per Week: Not on file    Minutes of Exercise per Session: Not on file   Stress:     Feeling of Stress : Not on file   Social Connections:     Frequency of Communication with Friends and Family: Not on file    Frequency of Social Gatherings with Friends and Family: Not on file    Attends Anabaptist Services: Not on file    Active Member of Clubs or Organizations: Not on file    Attends Club or Organization Meetings: Not on file    Marital Status: Not on file   Intimate Partner Violence:     Fear of Current or Ex-Partner: Not on file    Emotionally Abused: Not on file    Physically Abused: Not on file    Sexually Abused: Not on file   Housing Stability:     Unable to Pay for Housing in the Last Year: Not on file    Number of Places Lived in the Last Year: Not on file    Unstable Housing in the Last Year: Not on file       Review of Systems   Musculoskeletal:        Knee pain       Vitals: There were no vitals taken for this visit. There is no height or weight on file to calculate BMI. Ortho Exam     Left knee:   No effusion. Right knee:   No effusion. Full range of motion the knee joint. There is pain with manipulation of the patella. There is crepitation with manipulation of the patella and with range of motion. There is no medial or lateral joint line tenderness. Glendy's maneuvers negative. Collateral ligaments are intact. Anterior drawer and posterior drawer negative. There is no soft tissue swelling distally. Neurovascular examination is intact. Prior portal sites of healed well. There is pain with manipulation of the patella. There is crepitation with manipulation of the patella and with range of motion. There is no medial or lateral joint line tenderness. Glendy's maneuvers negative. Collateral ligaments are intact. Anterior drawer and posterior drawer negative. There is no soft tissue swelling distally. Neurovascular examination is intact. ASSESSMENT/PLAN:    Patient presents the office today with signs and symptoms of continued patella chondromalacia. She has had improvement since making this appointment. Therefore, I think it is reasonable to treat this without aggressive options. I have discussed a home exercise program.  We talked about the use of appropriate anti-inflammatory medicine. We have also discussed the use of a sleeve all of which may be helpful. If this persist, I would ask her to return to the office in May consider cortisone.   She is comfortable with this approach        Rob Pate MD

## 2022-03-19 PROBLEM — K81.9 CHOLECYSTITIS: Status: ACTIVE | Noted: 2018-12-13

## 2022-06-08 ENCOUNTER — OFFICE VISIT (OUTPATIENT)
Dept: ORTHOPEDIC SURGERY | Age: 52
End: 2022-06-08
Payer: COMMERCIAL

## 2022-06-08 DIAGNOSIS — M25.462 EFFUSION, LEFT KNEE: Primary | ICD-10-CM

## 2022-06-08 PROCEDURE — 20610 DRAIN/INJ JOINT/BURSA W/O US: CPT | Performed by: ORTHOPAEDIC SURGERY

## 2022-06-08 RX ORDER — TRIAMCINOLONE ACETONIDE 40 MG/ML
80 INJECTION, SUSPENSION INTRA-ARTICULAR; INTRAMUSCULAR ONCE
Status: COMPLETED | OUTPATIENT
Start: 2022-06-08 | End: 2022-06-08

## 2022-06-08 RX ADMIN — TRIAMCINOLONE ACETONIDE 80 MG: 40 INJECTION, SUSPENSION INTRA-ARTICULAR; INTRAMUSCULAR at 17:32

## 2022-06-08 NOTE — LETTER
6/8/2022    Patient: April Rojas   YOB: 1970   Date of Visit: 6/8/2022     Demond Aquino MD  [address] [facility] [address]  Via Fax: [phone]    Dear Demond Aquino MD,      Thank you for referring Ms. Regina Padron to [facility] for evaluation. My notes for this consultation are attached. If you have questions, please do not hesitate to call me. I look forward to following your patient along with you.       Sincerely,    Franko Vásquez MD

## 2022-06-08 NOTE — PROGRESS NOTES
Jenni Boo (: 1970) is a 46 y.o. female, patient, here for evaluation of the following chief complaint(s):  Knee Pain (knee pain)       HPI:    Patient presents to the office today with swelling of the knee joint. This is patient had taken care of her for the past she is status post arthroscopy of her left knee for quite some time ago. I saw her before the holiday season in which she had swelling in the knee. It went away. She recently went on vacation and did a lot of walking. Without trauma, she developed swelling in the knee joint. With a little bit of rest gotten a little bit better. She denies catching or popping or locking in the knee joint. She does not report any recent level of trauma. No Known Allergies    Current Outpatient Medications   Medication Sig    levETIRAcetam (KEPPRA) 500 mg tablet Take 3 Tabs by mouth two (2) times a day.  amlodipine besylate (AMLODIPINE PO) Take  by mouth.  lacosamide (VIMPAT) 100 mg tab tablet Take 100 mg by mouth two (2) times a day. 1.5 tablet by mouth in morning and 2 tablets in afternoon    garlic 6,838 mg cap Take 1,000 mg by mouth daily.  multivitamin (ONE A DAY) tablet Take 1 Tab by mouth daily.  DULoxetine (CYMBALTA) 60 mg capsule Take 60 mg by mouth daily.  cholecalciferol, vitamin D3, (VITAMIN D3) 2,000 unit tab Take 2,000 Int'l Units by mouth two (2) times a day. No current facility-administered medications for this visit.        Past Medical History:   Diagnosis Date    Arthritis     neck and wrist    Chronic pain     DJD Cervical spine    GERD (gastroesophageal reflux disease)     Ill-defined condition     erlos danchava    Psychiatric disorder     Depression, anxiety    Seizures (HCC)     simple partial seizures last one beginning of 2016    Stroke [facility])     Residual Right Sided Weakness    Thromboembolus [facility])     with stroke        Past Surgical History:   Procedure Laterality Date    HX ABDOMINOPLASTY  2015    abdominoplasty    HX APPENDECTOMY  1987    [address], [address] & 2003    HX CHOLECYSTECTOMY  12/13/2018    misty [id number]. Ravindra Cai    HX GYN  4119'M    Laparoscopic lysis of adhesions    HX GYN  2004    Uterine Ablation    HX ORTHOPAEDIC Left 1993 & 2002    ORIF left fractured wrist, implants removed in 2002    HX ORTHOPAEDIC Right     Fusion-right foot, toes 2-5 due to contractures from residual weakness from stroke    HX TONSILLECTOMY      [place] ABDOMEN SURGERY PROC UNLISTED  1987    Open Appendectomy       No family history on file. Social History     Socioeconomic History    Marital status:      Spouse name: Not on file    Number of children: Not on file    Years of education: Not on file    Highest education level: Not on file   Occupational History    Not on file   Tobacco Use    Smoking status: Never Smoker    Smokeless tobacco: Never Used   Substance and Sexual Activity    Alcohol use: Yes     Alcohol/week: 2.0 standard drinks     Types: 2 Glasses of wine per week     Comment: Rarely with dinner/once a week    Drug use: No    Sexual activity: Not on file   Other Topics Concern    Not on file   Social History Narrative    Not on file     Social Determinants of Health     Financial Resource Strain:     Difficulty of Paying Living Expenses: Not on file   Food Insecurity:     Worried About Running Out of Food in the Last Year: Not on file    Sarah of Food in the Last Year: Not on file   Transportation Needs:     Lack of Transportation (Medical): Not on file    Lack of Transportation (Non-Medical):  Not on file   Physical Activity:     Days of Exercise per Week: Not on file    Minutes of Exercise per Session: Not on file   Stress:     Feeling of Stress : Not on file   Social Connections:     Frequency of Communication with Friends and Family: Not on file    Frequency of Social Gatherings with Friends and Family: Not on file   Ayden Kumar Attends [employer]ist Services: Not on file    Active Member of Clubs or Organizations: Not on file    Attends Club or Organization Meetings: Not on file    Marital Status: Not on file   Intimate Partner Violence:     Fear of Current or Ex-Partner: Not on file    Emotionally Abused: Not on file    Physically Abused: Not on file    Sexually Abused: Not on file   Housing Stability:     Unable to Pay for Housing in the Last Year: Not on file    Number of [place] in the Last Year: Not on file    Unstable Housing in the Last Year: Not on file       Review of Systems   Musculoskeletal:        Knee pain       Vitals: There were no vitals taken for this visit. There is no height or weight on file to calculate BMI. Ortho Exam     Patient is alert and oriented x3. Patient is in no acute distress. Patient ambulates with a antalgic gait    Left knee: Portal sites from prior surgery of healed well. 2+ knee effusion. Range of motion 0-132 degrees. Pain is only noted at the extremes of flexion secondary to the effusion. She has no medial or lateral joint tenderness. Glendy's maneuver is negative. No ligamentous instability. Neurovascular examination is intact. Right knee: There is no abrasions, lacerations, ecchymosis or soft tissue swelling. No effusion is identified. There is no pain to palpation along the medial or lateral border of the patella. There is no pain or crepitation with manipulation of the patella. There is normal excursion of the patella. Patellar grind test is negative. Active and passive range of motion is full and does not cause pain or crepitation. There is no pain with palpation along the medial femoral epicondyle or medial tibia and no pain with palpation over the lateral femoral epicondyle. There is no medial or lateral joint line tenderness. Glendy's maneuver is negative. There is no collateral ligament instability.   Anterior drawer, Lachman and posterior drawer are negative. There is no soft tissue swelling distally into the leg. Neurocirculatory examination is intact. ASSESSMENT/PLAN:    Patient returns to the office with recurrent swelling in the knee joint. Fortunately, it has not been recurrent. This certainly could be an irritation and she is prone to develop effusions. Therefore, I think it is reasonable to maintain conservative treatment options. We have talked about these options and she is elected proceed with aspiration injection. I think this is reasonable and appropriate. Consent for the injection was obtained. Risk of postinjection infection, lack of improvement, hypopigmentation and unusual allergic reaction were explained to the patient. After consent, the skin was sterilely prepped and 80 mg of triamcinolone and 5 cc of 0.25% plain Marcaine was was injected in the left knee. Aspiration was also performed of 40 cc of sterile fluid. Patient had no complications. Patient is to ice modify activities for 24 hours.   Patient is to return to the office if no improvement        Olga Mccollum MD

## 2022-09-28 ENCOUNTER — OFFICE VISIT (OUTPATIENT)
Dept: ORTHOPEDIC SURGERY | Age: 52
End: 2022-09-28
Payer: COMMERCIAL

## 2022-09-28 DIAGNOSIS — M25.462 EFFUSION OF LEFT KNEE: Primary | ICD-10-CM

## 2022-09-28 PROCEDURE — 20610 DRAIN/INJ JOINT/BURSA W/O US: CPT | Performed by: ORTHOPAEDIC SURGERY

## 2022-09-28 RX ORDER — TRIAMCINOLONE ACETONIDE 40 MG/ML
80 INJECTION, SUSPENSION INTRA-ARTICULAR; INTRAMUSCULAR ONCE
Status: COMPLETED | OUTPATIENT
Start: 2022-09-28 | End: 2022-09-28

## 2022-09-28 RX ADMIN — TRIAMCINOLONE ACETONIDE 80 MG: 40 INJECTION, SUSPENSION INTRA-ARTICULAR; INTRAMUSCULAR at 18:10

## 2022-09-28 NOTE — PROGRESS NOTES
Favian Fleming (: 1970) is a 45 y.o. female, patient, here for evaluation of the following chief complaint(s):  No chief complaint on file. HPI:    Patient is here 1 year status post left knee arthroscopy with recurrent pain discomfort to the left knee. She seen approximately 3 months prior with swelling in his left knee, the fluid was aspirated and she was injected with steroid medication. She did very well from this for the past several months. For the past few weeks she noticed some mild increase in her symptoms. Her swelling has returned, but has improved since the prior few days. No Known Allergies    Current Outpatient Medications   Medication Sig    levETIRAcetam (KEPPRA) 500 mg tablet Take 3 Tabs by mouth two (2) times a day. amlodipine besylate (AMLODIPINE PO) Take  by mouth. lacosamide (VIMPAT) 100 mg tab tablet Take 100 mg by mouth two (2) times a day. 1.5 tablet by mouth in morning and 2 tablets in afternoon    garlic 4,379 mg cap Take 1,000 mg by mouth daily. multivitamin (ONE A DAY) tablet Take 1 Tab by mouth daily. DULoxetine (CYMBALTA) 60 mg capsule Take 60 mg by mouth daily. cholecalciferol, vitamin D3, (VITAMIN D3) 2,000 unit tab Take 2,000 Int'l Units by mouth two (2) times a day. No current facility-administered medications for this visit.        Past Medical History:   Diagnosis Date    Arthritis     neck and wrist    Chronic pain     DJD Cervical spine    GERD (gastroesophageal reflux disease)     Ill-defined condition     erlos danchava    Psychiatric disorder     Depression, anxiety    Seizures ([facility] [address]     simple partial seizures last one beginning of 2016    Stroke [facility])     Residual Right Sided Weakness    Thromboembolus ([facility] [address]     with stroke        Past Surgical History:   Procedure Laterality Date    HX ABDOMINOPLASTY      abdominoplasty    HX APPENDECTOMY      HX [address] [facility]     HX CHOLECYSTECTOMY  2018 [facility] [place] [facility]    HX GYN  8351'U    Laparoscopic lysis of adhesions    HX GYN  2004    Uterine Ablation    HX ORTHOPAEDIC Left 1993 & 2002    ORIF left fractured wrist, implants removed in 2002    HX ORTHOPAEDIC Right     Fusion-right foot, toes 2-5 due to contractures from residual weakness from stroke    HX TONSILLECTOMY      AR ABDOMEN SURGERY PROC UNLISTED  1987    Open Appendectomy       No family history on file. Social History     Socioeconomic History    Marital status:      Spouse name: Not on file    Number of children: Not on file    Years of education: Not on file    Highest education level: Not on file   Occupational History    Not on file   Tobacco Use    Smoking status: Never    Smokeless tobacco: Never   Substance and Sexual Activity    Alcohol use: Yes     Alcohol/week: 2.0 standard drinks     Types: 2 Glasses of wine per week     Comment: Rarely with dinner/once a week    Drug use: No    Sexual activity: Not on file   Other Topics Concern    Not on file   Social History Narrative    Not on file     Social Determinants of Health     Financial Resource Strain: Not on file   Food Insecurity: Not on file   Transportation Needs: Not on file   Physical Activity: Not on file   Stress: Not on file   Social Connections: Not on file   Intimate Partner Violence: Not on file   Housing Stability: Not on file       Review of Systems   Musculoskeletal:         Knee swelling     Vitals: There were no vitals taken for this visit. There is no height or weight on file to calculate BMI. Ortho Exam   Patient is alert and oriented x3. Patient is in no acute distress. Patient ambulates with a nonantalgic gait. Left knee: Small effusion of the knee is noted. Patient has full range of motion from 0-130 but has pain with hyperflexion hyperextension maneuvers. There is medial joint line tenderness. Glendy's maneuver is negative. .  Knee is stable to varus and valgus stress at 0 and 30 degrees. There is a negative Lachman's, negative anterior and posterior drawer tests. Distally the extremity is neurovascularly intact. ASSESSMENT/PLAN:    Patient presents with left knee pain status postarthroscopy. She is noticed that when she significantly increases her activity she will get some swelling in the knee which then subsequently causes her pain. She did very well after her last visit with left knee aspiration and corticosteroid injection. I think this is a good option for her again. Consent for the injection was obtained. Risk of postinjection infection, lack of improvement, hypopigmentation and unusual allergic reaction were explained to the patient. After consent, the skin was sterilely prepped, 3 cc of 1% lidocaine was used anesthetize the aspiration site. A total of 25cc of synovial fluid was aspirated from the knee, and 80 mg of triamcinolone and 5 cc of 0.25% plain Marcaine was was injected in the left knee. Patient had no complications. Patient is to ice modify activities for 24 hours.   Patient is to return to the office if no improvement        Herman Oconnor MD

## 2022-09-28 NOTE — LETTER
9/28/2022    Patient: Franklyn Duke   YOB: 1970   Date of Visit: 9/28/2022     Margaux Miller MD  [address] [facility] [address]  Via Fax: [phone]    Dear Margaux Miller MD,      Thank you for referring Ms. Lila Chan to [facility] for evaluation. My notes for this consultation are attached. If you have questions, please do not hesitate to call me. I look forward to following your patient along with you.       Sincerely,    Cassie Fitzpatrick MD

## 2022-12-19 ENCOUNTER — OFFICE VISIT (OUTPATIENT)
Dept: ORTHOPEDIC SURGERY | Age: 52
End: 2022-12-19
Payer: COMMERCIAL

## 2022-12-19 DIAGNOSIS — M25.562 ACUTE PAIN OF LEFT KNEE: Primary | ICD-10-CM

## 2022-12-19 DIAGNOSIS — M25.462 KNEE EFFUSION, LEFT: ICD-10-CM

## 2022-12-19 RX ORDER — BETAMETHASONE SODIUM PHOSPHATE AND BETAMETHASONE ACETATE 3; 3 MG/ML; MG/ML
12 INJECTION, SUSPENSION INTRA-ARTICULAR; INTRALESIONAL; INTRAMUSCULAR; SOFT TISSUE ONCE
Status: COMPLETED | OUTPATIENT
Start: 2022-12-19 | End: 2022-12-19

## 2022-12-19 RX ADMIN — BETAMETHASONE SODIUM PHOSPHATE AND BETAMETHASONE ACETATE 12 MG: 3; 3 INJECTION, SUSPENSION INTRA-ARTICULAR; INTRALESIONAL; INTRAMUSCULAR; SOFT TISSUE at 17:39

## 2022-12-19 NOTE — PROGRESS NOTES
Yesi Figueroa (: 1970) is a 45 y.o. female, patient, here for evaluation of the following chief complaint(s):  Knee Pain (Left knee pain )       HPI:    Patient presents to the office today with recurrent swelling of the left knee. This patient I saw in the office approximately 3-1/2 months ago. Unfortunately, she has now developed recurrent swelling of the knee joint. She states the pain is mostly located along the inner aspect of the knee with some level of pain into the posterior medial aspect of the knee. No Known Allergies    Current Outpatient Medications   Medication Sig    levETIRAcetam (KEPPRA) 500 mg tablet Take 3 Tabs by mouth two (2) times a day. amlodipine besylate (AMLODIPINE PO) Take  by mouth. lacosamide (VIMPAT) 100 mg tab tablet Take 100 mg by mouth two (2) times a day. 1.5 tablet by mouth in morning and 2 tablets in afternoon    garlic 8,386 mg cap Take 1,000 mg by mouth daily. multivitamin (ONE A DAY) tablet Take 1 Tab by mouth daily. DULoxetine (CYMBALTA) 60 mg capsule Take 60 mg by mouth daily. cholecalciferol, vitamin D3, (VITAMIN D3) 2,000 unit tab Take 2,000 Int'l Units by mouth two (2) times a day. No current facility-administered medications for this visit.        Past Medical History:   Diagnosis Date    Arthritis     neck and wrist    Chronic pain     DJD Cervical spine    GERD (gastroesophageal reflux disease)     Ill-defined condition     erlos danlos    Psychiatric disorder     Depression, anxiety    Seizures ([facility] [address]     simple partial seizures last one beginning of 2016    Stroke [facility]) 1996    Residual Right Sided Weakness    Thromboembolus ([facility] [address]     with stroke        Past Surgical History:   Procedure Laterality Date    HX ABDOMINOPLASTY  2015    abdominoplasty    HX APPENDECTOMY      [address] [facility],  &     HX CHOLECYSTECTOMY  2018    [facility]    HX GYN  1212'C    Laparoscopic lysis of adhesions HX GYN  2004    Uterine Ablation    HX ORTHOPAEDIC Left 1993 & 2002    ORIF left fractured wrist, implants removed in 2002    HX ORTHOPAEDIC Right     Fusion-right foot, toes 2-5 due to contractures from residual weakness from stroke    HX TONSILLECTOMY      AR ABDOMEN SURGERY PROC UNLISTED  1987    Open Appendectomy       No family history on file. Social History     Socioeconomic History    Marital status:      Spouse name: Not on file    Number of children: Not on file    Years of education: Not on file    Highest education level: Not on file   Occupational History    Not on file   Tobacco Use    Smoking status: Never    Smokeless tobacco: Never   Substance and Sexual Activity    Alcohol use: Yes     Alcohol/week: 2.0 standard drinks     Types: 2 Glasses of wine per week     Comment: Rarely with dinner/once a week    Drug use: No    Sexual activity: Not on file   Other Topics Concern    Not on file   Social History Narrative    Not on file     Social Determinants of Health     Financial Resource Strain: Not on file   Food Insecurity: Not on file   Transportation Needs: Not on file   Physical Activity: Not on file   Stress: Not on file   Social Connections: Not on file   Intimate Partner Violence: Not on file   Housing Stability: Not on file       Review of Systems   Musculoskeletal:         Left knee      Vitals: There were no vitals taken for this visit. There is no height or weight on file to calculate BMI. Ortho Exam     Patient is alert and oriented x3. Patient is in no acute distress. Patient ambulates with a nonantalgic gait. Left knee: Moderate effusion of the knee is noted. Patient has full range of motion from 0-130 but has pain with hyperflexion hyperextension maneuvers. There is medial joint line tenderness. Glendy's maneuver is negative. .  Knee is stable to varus and valgus stress at 0 and 30 degrees.   There is a negative Lachman's, negative anterior and posterior drawer tests. Distally the extremity is neurovascularly intact. ASSESSMENT/PLAN:    Patient returns to the office with recurrent effusion in the knee. First and foremost, I would recommend aspiration and injection of the knee joint. This is done very well for her before in the past.  However, I would also recommend an MRI evaluation of the knee. Patient continues with recurrent knee effusion. I do know she has our osteoarthritis of the knee joint but need to confirm other sources of recurrent effusion. Patient agrees with this. Consent for the injection was obtained. Risk of postinjection infection, lack of improvement, hypopigmentation and unusual allergic reaction were explained to the patient. After consent, the skin was sterilely prepped and 12 mg of betamethasone and 5 cc of 0.25% plain Marcaine was was injected in the left knee. 30 cc of sterile fluid was drained out of the knee. Patient had no complications. Patient is to ice modify activities for 24 hours.   Patient is to return to the office if no improvement        Bibi Rich MD

## 2022-12-19 NOTE — LETTER
12/19/2022    Patient: Sergio Box   YOB: 1970   Date of Visit: 12/19/2022     Juanita Neely MD  [address] [id number]  Via Fax: [phone]    Dear Juanita Neely MD,      Thank you for referring Ms. Yaron Godfrey to [facility] for evaluation. My notes for this consultation are attached. If you have questions, please do not hesitate to call me. I look forward to following your patient along with you.       Sincerely,    Lien Dorsey MD

## 2023-01-16 ENCOUNTER — OFFICE VISIT (OUTPATIENT)
Dept: ORTHOPEDIC SURGERY | Age: 53
End: 2023-01-16
Payer: COMMERCIAL

## 2023-01-16 DIAGNOSIS — M25.462 EFFUSION OF LEFT KNEE: ICD-10-CM

## 2023-01-16 DIAGNOSIS — M25.562 ACUTE PAIN OF LEFT KNEE: Primary | ICD-10-CM

## 2023-01-16 DIAGNOSIS — M17.12 PRIMARY OSTEOARTHRITIS OF LEFT KNEE: ICD-10-CM

## 2023-01-16 PROCEDURE — 99214 OFFICE O/P EST MOD 30 MIN: CPT | Performed by: ORTHOPAEDIC SURGERY

## 2023-01-16 NOTE — LETTER
1/16/2023    Patient: Orestes Rojas   YOB: 1970   Date of Visit: 1/16/2023     Rusty Marquis MD  [address] [id number]  Via Fax: [phone]    Dear Rusty Marquis MD,      Thank you for referring Ms. Bird Gtz to [facility] for evaluation. My notes for this consultation are attached. If you have questions, please do not hesitate to call me. I look forward to following your patient along with you.       Sincerely,    Debby Selby MD

## 2023-01-16 NOTE — PROGRESS NOTES
Eric Campbell (: 1970) is a 45 y.o. female, patient, here for evaluation of the following chief complaint(s):  Knee Pain (Left knee )       HPI:    Patient presents the office today with a chief complaint of left knee pain. Patient is now status post MRI evaluation of the left knee. Last seen in the office and aspiration and injection was performed. This did well. Unfortunately she went innertubing and irritated her knee developed some swelling. Since that time, the swelling has improved. No Known Allergies    Current Outpatient Medications   Medication Sig    levETIRAcetam (KEPPRA) 500 mg tablet Take 3 Tabs by mouth two (2) times a day. amlodipine besylate (AMLODIPINE PO) Take  by mouth. lacosamide (VIMPAT) 100 mg tab tablet Take 100 mg by mouth two (2) times a day. 1.5 tablet by mouth in morning and 2 tablets in afternoon    garlic 1,763 mg cap Take 1,000 mg by mouth daily. multivitamin (ONE A DAY) tablet Take 1 Tab by mouth daily. DULoxetine (CYMBALTA) 60 mg capsule Take 60 mg by mouth daily. cholecalciferol, vitamin D3, (VITAMIN D3) 2,000 unit tab Take 2,000 Int'l Units by mouth two (2) times a day. No current facility-administered medications for this visit.        Past Medical History:   Diagnosis Date    Arthritis     neck and wrist    Chronic pain     DJD Cervical spine    GERD (gastroesophageal reflux disease)     Ill-defined condition     erlos danchava    Psychiatric disorder     Depression, anxiety    Seizures ([facility] [address]     simple partial seizures last one beginning of 2016    Stroke [facility])     Residual Right Sided Weakness    Thromboembolus ([facility] [address]     with stroke        Past Surgical History:   Procedure Laterality Date    HX ABDOMINOPLASTY      abdominoplasty    HX APPENDECTOMY      [address] [facility],  &     HX CHOLECYSTECTOMY  2018    [facility] [place]    HX GYN  0212'Y    Laparoscopic lysis of adhesions    HX GYN   Uterine Ablation    HX ORTHOPAEDIC Left 1993 & 2002    ORIF left fractured wrist, implants removed in 2002    HX ORTHOPAEDIC Right     Fusion-right foot, toes 2-5 due to contractures from residual weakness from stroke    HX TONSILLECTOMY      PA ABDOMEN SURGERY PROC UNLISTED  1987    Open Appendectomy       No family history on file. Social History     Socioeconomic History    Marital status:      Spouse name: Not on file    Number of children: Not on file    Years of education: Not on file    Highest education level: Not on file   Occupational History    Not on file   Tobacco Use    Smoking status: Never    Smokeless tobacco: Never   Substance and Sexual Activity    Alcohol use: Yes     Alcohol/week: 2.0 standard drinks     Types: 2 Glasses of wine per week     Comment: Rarely with dinner/once a week    Drug use: No    Sexual activity: Not on file   Other Topics Concern    Not on file   Social History Narrative    Not on file     Social Determinants of Health     Financial Resource Strain: Not on file   Food Insecurity: Not on file   Transportation Needs: Not on file   Physical Activity: Not on file   Stress: Not on file   Social Connections: Not on file   Intimate Partner Violence: Not on file   Housing Stability: Not on file       Review of Systems   Musculoskeletal:         Left knee      Vitals: There were no vitals taken for this visit. There is no height or weight on file to calculate BMI. Ortho Exam     Patient is alert and oriented x3. Patient is in no acute distress. Patient ambulates with a nonantalgic gait. Left knee: Moderate effusion of the knee is noted. Patient has full range of motion from 0-130 but has pain with hyperflexion hyperextension maneuvers. There is medial joint line tenderness. Glendy's maneuver is negative. .  Knee is stable to varus and valgus stress at 0 and 30 degrees. There is a negative Lachman's, negative anterior and posterior drawer tests.   Distally the extremity is neurovascularly intact. MRI evaluation shows evidence of chondrosis mostly located along the medial aspect of the knee joint. There is evidence of prior meniscus surgery. There is some redundancy and some diminution of the posterior horn of the medial meniscus. However, I do not appreciate any new onset meniscus tear. XR Results (most recent):  Results from Appointment encounter on 01/16/23    XR KNEES BI STAND    Narrative  PA flexed view of bilateral knees show narrowing of the medial joint space although not bone-on-bone arthritis         ASSESSMENT/PLAN:    I have gone over the results with the patient. It would appear the patient's examination is most consistent with osteoarthritis of the joint. Her x-rays also show some narrowing of the medial joint space. We have discussed other treatment options. We have elected to proceed with viscosupplementation injection. Patient understands there may be a rejection by her insurance company in a normal fashion. We will proceed with the authorization.         Noelle Gaines MD

## 2025-01-15 ENCOUNTER — HOSPITAL ENCOUNTER (EMERGENCY)
Facility: HOSPITAL | Age: 55
Discharge: HOME | End: 2025-01-15
Attending: EMERGENCY MEDICINE
Payer: COMMERCIAL

## 2025-01-15 ENCOUNTER — APPOINTMENT (OUTPATIENT)
Facility: HOSPITAL | Age: 55
End: 2025-01-15
Payer: COMMERCIAL

## 2025-01-15 VITALS
TEMPERATURE: 98.1 F | BODY MASS INDEX: 34.34 KG/M2 | RESPIRATION RATE: 10 BRPM | SYSTOLIC BLOOD PRESSURE: 122 MMHG | OXYGEN SATURATION: 100 % | DIASTOLIC BLOOD PRESSURE: 78 MMHG | HEIGHT: 61 IN | HEART RATE: 65 BPM | WEIGHT: 181.88 LBS

## 2025-01-15 DIAGNOSIS — R42 LIGHTHEADEDNESS: Primary | ICD-10-CM

## 2025-01-15 DIAGNOSIS — R68.89 FORGETFULNESS: ICD-10-CM

## 2025-01-15 LAB
ALBUMIN SERPL-MCNC: 3.5 G/DL (ref 3.5–5)
ALBUMIN/GLOB SERPL: 0.9 (ref 1.1–2.2)
ALP SERPL-CCNC: 114 U/L (ref 45–117)
ALT SERPL-CCNC: 24 U/L (ref 12–78)
ANION GAP SERPL CALC-SCNC: 9 MMOL/L (ref 2–12)
APPEARANCE UR: CLEAR
AST SERPL-CCNC: 20 U/L (ref 15–37)
BACTERIA URNS QL MICRO: NEGATIVE /HPF
BASOPHILS # BLD: 0.04 K/UL (ref 0–0.1)
BASOPHILS NFR BLD: 0.5 % (ref 0–1)
BILIRUB SERPL-MCNC: 0.4 MG/DL (ref 0.2–1)
BILIRUB UR QL: NEGATIVE
BUN SERPL-MCNC: 18 MG/DL (ref 6–20)
BUN/CREAT SERPL: 29 (ref 12–20)
CALCIUM SERPL-MCNC: 9 MG/DL (ref 8.5–10.1)
CHLORIDE SERPL-SCNC: 104 MMOL/L (ref 97–108)
CO2 SERPL-SCNC: 30 MMOL/L (ref 21–32)
COLOR UR: ABNORMAL
COMMENT:: NORMAL
CREAT SERPL-MCNC: 0.63 MG/DL (ref 0.55–1.02)
DIFFERENTIAL METHOD BLD: NORMAL
EKG ATRIAL RATE: 75 BPM
EKG DIAGNOSIS: NORMAL
EKG P AXIS: 24 DEGREES
EKG P-R INTERVAL: 182 MS
EKG Q-T INTERVAL: 384 MS
EKG QRS DURATION: 106 MS
EKG QTC CALCULATION (BAZETT): 428 MS
EKG R AXIS: 85 DEGREES
EKG T AXIS: 22 DEGREES
EKG VENTRICULAR RATE: 75 BPM
EOSINOPHIL # BLD: 0.11 K/UL (ref 0–0.4)
EOSINOPHIL NFR BLD: 1.5 % (ref 0–7)
EPITH CASTS URNS QL MICRO: ABNORMAL /LPF
ERYTHROCYTE [DISTWIDTH] IN BLOOD BY AUTOMATED COUNT: 11.9 % (ref 11.5–14.5)
FLUAV RNA SPEC QL NAA+PROBE: NOT DETECTED
FLUBV RNA SPEC QL NAA+PROBE: NOT DETECTED
GLOBULIN SER CALC-MCNC: 3.7 G/DL (ref 2–4)
GLUCOSE SERPL-MCNC: 110 MG/DL (ref 65–100)
GLUCOSE UR STRIP.AUTO-MCNC: NEGATIVE MG/DL
HCT VFR BLD AUTO: 41.6 % (ref 35–47)
HGB BLD-MCNC: 13.9 G/DL (ref 11.5–16)
HGB UR QL STRIP: NEGATIVE
IMM GRANULOCYTES # BLD AUTO: 0.04 K/UL (ref 0–0.04)
IMM GRANULOCYTES NFR BLD AUTO: 0.5 % (ref 0–0.5)
KETONES UR QL STRIP.AUTO: NEGATIVE MG/DL
LEUKOCYTE ESTERASE UR QL STRIP.AUTO: NEGATIVE
LYMPHOCYTES # BLD: 1.94 K/UL (ref 0.8–3.5)
LYMPHOCYTES NFR BLD: 25.6 % (ref 12–49)
MCH RBC QN AUTO: 30 PG (ref 26–34)
MCHC RBC AUTO-ENTMCNC: 33.4 G/DL (ref 30–36.5)
MCV RBC AUTO: 89.8 FL (ref 80–99)
MONOCYTES # BLD: 0.52 K/UL (ref 0–1)
MONOCYTES NFR BLD: 6.9 % (ref 5–13)
NEUTS SEG # BLD: 4.93 K/UL (ref 1.8–8)
NEUTS SEG NFR BLD: 65 % (ref 32–75)
NITRITE UR QL STRIP.AUTO: NEGATIVE
NRBC # BLD: 0 K/UL (ref 0–0.01)
NRBC BLD-RTO: 0 PER 100 WBC
PH UR STRIP: 8 (ref 5–8)
PLATELET # BLD AUTO: 210 K/UL (ref 150–400)
PMV BLD AUTO: 10.9 FL (ref 8.9–12.9)
POTASSIUM SERPL-SCNC: 3.8 MMOL/L (ref 3.5–5.1)
PROT SERPL-MCNC: 7.2 G/DL (ref 6.4–8.2)
PROT UR STRIP-MCNC: NEGATIVE MG/DL
RBC # BLD AUTO: 4.63 M/UL (ref 3.8–5.2)
RBC #/AREA URNS HPF: ABNORMAL /HPF (ref 0–5)
SARS-COV-2 RNA RESP QL NAA+PROBE: NOT DETECTED
SODIUM SERPL-SCNC: 143 MMOL/L (ref 136–145)
SOURCE: NORMAL
SP GR UR REFRACTOMETRY: 1.01 (ref 1–1.03)
SPECIMEN HOLD: NORMAL
TROPONIN I SERPL HS-MCNC: <4 NG/L (ref 0–51)
UROBILINOGEN UR QL STRIP.AUTO: 0.2 EU/DL (ref 0.2–1)
WBC # BLD AUTO: 7.6 K/UL (ref 3.6–11)
WBC URNS QL MICRO: ABNORMAL /HPF (ref 0–4)

## 2025-01-15 PROCEDURE — 6360000004 HC RX CONTRAST MEDICATION: Performed by: EMERGENCY MEDICINE

## 2025-01-15 PROCEDURE — 93005 ELECTROCARDIOGRAM TRACING: CPT | Performed by: STUDENT IN AN ORGANIZED HEALTH CARE EDUCATION/TRAINING PROGRAM

## 2025-01-15 PROCEDURE — 81001 URINALYSIS AUTO W/SCOPE: CPT

## 2025-01-15 PROCEDURE — 99285 EMERGENCY DEPT VISIT HI MDM: CPT

## 2025-01-15 PROCEDURE — 36415 COLL VENOUS BLD VENIPUNCTURE: CPT

## 2025-01-15 PROCEDURE — 87636 SARSCOV2 & INF A&B AMP PRB: CPT

## 2025-01-15 PROCEDURE — 70450 CT HEAD/BRAIN W/O DYE: CPT

## 2025-01-15 PROCEDURE — 85025 COMPLETE CBC W/AUTO DIFF WBC: CPT

## 2025-01-15 PROCEDURE — 96360 HYDRATION IV INFUSION INIT: CPT

## 2025-01-15 PROCEDURE — 70498 CT ANGIOGRAPHY NECK: CPT

## 2025-01-15 PROCEDURE — 80053 COMPREHEN METABOLIC PANEL: CPT

## 2025-01-15 PROCEDURE — 2580000003 HC RX 258: Performed by: EMERGENCY MEDICINE

## 2025-01-15 PROCEDURE — 84484 ASSAY OF TROPONIN QUANT: CPT

## 2025-01-15 RX ORDER — MELOXICAM 7.5 MG/1
7.5 TABLET ORAL DAILY
COMMUNITY

## 2025-01-15 RX ORDER — IOPAMIDOL 755 MG/ML
100 INJECTION, SOLUTION INTRAVASCULAR
Status: COMPLETED | OUTPATIENT
Start: 2025-01-15 | End: 2025-01-15

## 2025-01-15 RX ORDER — 0.9 % SODIUM CHLORIDE 0.9 %
1000 INTRAVENOUS SOLUTION INTRAVENOUS ONCE
Status: COMPLETED | OUTPATIENT
Start: 2025-01-15 | End: 2025-01-15

## 2025-01-15 RX ORDER — ROSUVASTATIN CALCIUM 5 MG/1
1 TABLET, COATED ORAL DAILY
COMMUNITY
Start: 2024-08-21

## 2025-01-15 RX ADMIN — SODIUM CHLORIDE 1000 ML: 9 INJECTION, SOLUTION INTRAVENOUS at 16:54

## 2025-01-15 RX ADMIN — IOPAMIDOL 100 ML: 755 INJECTION, SOLUTION INTRAVENOUS at 15:38

## 2025-01-15 ASSESSMENT — ENCOUNTER SYMPTOMS
ABDOMINAL PAIN: 0
NAUSEA: 0
WHEEZING: 0
VOMITING: 0
COUGH: 0
SHORTNESS OF BREATH: 0

## 2025-01-15 ASSESSMENT — PAIN - FUNCTIONAL ASSESSMENT: PAIN_FUNCTIONAL_ASSESSMENT: NONE - DENIES PAIN

## 2025-01-15 NOTE — ED TRIAGE NOTES
Pt reports dizziness and confusion x1 week. 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 Pt reports she has weakness on her R side at baseline from her dissection when she was 25. Pt reports she has been feeling very anxious and the confusion and dizziness has been making her anxious given her previous medical history. Pt states she also noticed yesterday that her seizure medication looked different when she looked at the pill and noticed it was a different color than it normally is. Pt reports she also has felt very fatigued for the past week.

## 2025-01-15 NOTE — ED PROVIDER NOTES
[facility] EMERGENCY DEPARTMENT  EMERGENCY DEPARTMENT ENCOUNTER      Pt Name: Britney Hassan  MRN: [id number]  Birthdate 1970  Date of evaluation: 1/15/2025  Provider: Reyna Jacome MD    CHIEF COMPLAINT       Chief Complaint   Patient presents with    Altered Mental Status    Dizziness         HISTORY OF PRESENT ILLNESS    HPI    Britney Hassan is a 54 y.o. female with PMH significant for left carotid artery dissection at age 25 with residual right-sided weakness who presents to the emergency department with concern over forgetfulness for the past 1 week and intermittent lightheadedness when she bends over.  Patient is also concerned about a possible missed a dose of her seizure medication, 100 mg versus 150 mg of her lacosamide.  Patient reports she is currently taking 150 mg but does not recall her neurologist changing her from 100 mg to 150 mg.  Denies any recent trauma.  Denies any change in sensation or motor ability.  Nursing Notes were reviewed.    REVIEW OF SYSTEMS       Review of Systems   Constitutional:  Negative for chills and fever.   Respiratory:  Negative for cough, shortness of breath and wheezing.    Cardiovascular:  Negative for chest pain.   Gastrointestinal:  Negative for abdominal pain, nausea and vomiting.   Genitourinary:  Negative for difficulty urinating and flank pain.   Skin:  Negative for wound.   Neurological:  Positive for light-headedness. Negative for headaches.   Psychiatric/Behavioral:  Positive for confusion. Negative for suicidal ideas.            PAST MEDICAL HISTORY     Past Medical History:   Diagnosis Date    Arthritis     neck and wrist    Carotid artery dissection (HCC)     Chronic pain     DJD Cervical spine    GERD (gastroesophageal reflux disease)     Ill-defined condition     prakash boo    Psychiatric disorder     Depression, anxiety    Seizures (HCC)     simple partial seizures last one beginning of June 2016    Stroke (HCC) 1996    Residual Right Sided

## 2025-01-15 NOTE — ED NOTES
Pt discharged from Ed with printed and verbal instructions of follow up care, medication admin and when to return to ed. PT verbalized understanding. ARACELYRTERESSA

## (undated) DEVICE — INSUFFLATION NEEDLE: Brand: SURGINEEDLE

## (undated) DEVICE — APPLIER RMFG CLIP R MED/LG --

## (undated) DEVICE — E-Z CLEAN, PTFE COATED, ELECTROSURGICAL LAPAROSCOPIC ELECTRODE, L-HOOK, 33 CM., SINGLE-USE, FOR USE WITH HAND CONTROL PENCIL: Brand: MEGADYNE

## (undated) DEVICE — APPLIER CLP L SHFT DIA12MM 20 ROT MULT LIGACLP

## (undated) DEVICE — INFECTION CONTROL KIT SYS

## (undated) DEVICE — SOLUTION IV 1000ML 0.9% SOD CHL

## (undated) DEVICE — Device

## (undated) DEVICE — BLADELESS OPTICAL TROCAR WITH FIXATION CANNULA: Brand: VERSAPORT

## (undated) DEVICE — REM POLYHESIVE ADULT PATIENT RETURN ELECTRODE: Brand: VALLEYLAB

## (undated) DEVICE — (D)PREP SKN CHLRAPRP APPL 26ML -- CONVERT TO ITEM 371833

## (undated) DEVICE — SUTURE MCRYL SZ 4-0 L27IN ABSRB UD L19MM PS-2 1/2 CIR PRIM Y426H

## (undated) DEVICE — SPECIMEN RETRIEVAL POUCH: Brand: ENDO CATCH GOLD

## (undated) DEVICE — NDL HYPO RW/BVL 25GX1IN --

## (undated) DEVICE — STERILE POLYISOPRENE POWDER-FREE SURGICAL GLOVES WITH EMOLLIENT COATING: Brand: PROTEXIS

## (undated) DEVICE — FILTER SMK EVAC FLO CLR MEGADYNE

## (undated) DEVICE — SURGICAL PROCEDURE KIT GEN LAPAROSCOPY LF

## (undated) DEVICE — 3000CC GUARDIAN II: Brand: GUARDIAN

## (undated) DEVICE — SUTURE SZ 0 27IN 5/8 CIR UR-6  TAPER PT VIOLET ABSRB VICRYL J603H

## (undated) DEVICE — DRAPE XR C ARM 41X74IN LF --

## (undated) DEVICE — TUBING INSUFLTN 10FT LUER -- CONVERT TO ITEM 368568

## (undated) DEVICE — KENDALL SCD EXPRESS SLEEVES, KNEE LENGTH, MEDIUM: Brand: KENDALL SCD

## (undated) DEVICE — DEVON™ KNEE AND BODY STRAP 60" X 3" (1.5 M X 7.6 CM): Brand: DEVON

## (undated) DEVICE — MASTISOL ADHESIVE LIQ 2/3ML

## (undated) DEVICE — BLADELESS OPTICAL TROCAR WITH FIXATION CANNULA: Brand: VERSAONE

## (undated) DEVICE — DRAPE,REIN 53X77,STERILE: Brand: MEDLINE

## (undated) DEVICE — (D)STRIP SKN CLSR 0.5X4IN WHT --

## (undated) DEVICE — UNIVERSAL FIXATION CANNULA: Brand: VERSAONE